# Patient Record
Sex: FEMALE | Race: BLACK OR AFRICAN AMERICAN | NOT HISPANIC OR LATINO
[De-identification: names, ages, dates, MRNs, and addresses within clinical notes are randomized per-mention and may not be internally consistent; named-entity substitution may affect disease eponyms.]

---

## 2018-03-06 PROBLEM — Z00.00 ENCOUNTER FOR PREVENTIVE HEALTH EXAMINATION: Status: ACTIVE | Noted: 2018-03-06

## 2018-03-19 ENCOUNTER — ASOB RESULT (OUTPATIENT)
Age: 33
End: 2018-03-19

## 2018-03-19 ENCOUNTER — APPOINTMENT (OUTPATIENT)
Dept: ANTEPARTUM | Facility: CLINIC | Age: 33
End: 2018-03-19
Payer: MEDICAID

## 2018-03-19 PROCEDURE — 76819 FETAL BIOPHYS PROFIL W/O NST: CPT

## 2018-03-19 PROCEDURE — 76805 OB US >/= 14 WKS SNGL FETUS: CPT

## 2018-03-20 ENCOUNTER — TRANSCRIPTION ENCOUNTER (OUTPATIENT)
Age: 33
End: 2018-03-20

## 2018-06-02 ENCOUNTER — TRANSCRIPTION ENCOUNTER (OUTPATIENT)
Age: 33
End: 2018-06-02

## 2018-06-03 ENCOUNTER — EMERGENCY (EMERGENCY)
Facility: HOSPITAL | Age: 33
LOS: 1 days | Discharge: STILL ADMITTED OR ADMINISTRATIVE | End: 2018-06-03
Admitting: EMERGENCY MEDICINE

## 2018-06-03 ENCOUNTER — INPATIENT (INPATIENT)
Facility: HOSPITAL | Age: 33
LOS: 2 days | Discharge: HOME | End: 2018-06-06
Attending: OBSTETRICS & GYNECOLOGY | Admitting: OBSTETRICS & GYNECOLOGY
Payer: MEDICAID

## 2018-06-03 ENCOUNTER — RESULT REVIEW (OUTPATIENT)
Age: 33
End: 2018-06-03

## 2018-06-03 VITALS
OXYGEN SATURATION: 100 % | DIASTOLIC BLOOD PRESSURE: 100 MMHG | TEMPERATURE: 99 F | SYSTOLIC BLOOD PRESSURE: 145 MMHG | RESPIRATION RATE: 18 BRPM | HEART RATE: 120 BPM

## 2018-06-03 VITALS — HEIGHT: 61 IN | WEIGHT: 160.94 LBS

## 2018-06-03 DIAGNOSIS — Z3A.00 WEEKS OF GESTATION OF PREGNANCY NOT SPECIFIED: ICD-10-CM

## 2018-06-03 DIAGNOSIS — O26.899 OTHER SPECIFIED PREGNANCY RELATED CONDITIONS, UNSPECIFIED TRIMESTER: ICD-10-CM

## 2018-06-03 LAB
BASOPHILS # BLD AUTO: 0.02 K/UL — SIGNIFICANT CHANGE UP (ref 0–0.2)
BASOPHILS NFR BLD AUTO: 0.2 % — SIGNIFICANT CHANGE UP (ref 0–2)
BLD GP AB SCN SERPL QL: NEGATIVE — SIGNIFICANT CHANGE UP
EOSINOPHIL # BLD AUTO: 0.03 K/UL — SIGNIFICANT CHANGE UP (ref 0–0.5)
EOSINOPHIL NFR BLD AUTO: 0.3 % — SIGNIFICANT CHANGE UP (ref 0–6)
HCT VFR BLD CALC: 42.2 % — SIGNIFICANT CHANGE UP (ref 34.5–45)
HGB BLD-MCNC: 14.1 G/DL — SIGNIFICANT CHANGE UP (ref 11.5–15.5)
IMM GRANULOCYTES # BLD AUTO: 0.13 # — SIGNIFICANT CHANGE UP
IMM GRANULOCYTES NFR BLD AUTO: 1.4 % — SIGNIFICANT CHANGE UP (ref 0–1.5)
LYMPHOCYTES # BLD AUTO: 1.45 K/UL — SIGNIFICANT CHANGE UP (ref 1–3.3)
LYMPHOCYTES # BLD AUTO: 15.7 % — SIGNIFICANT CHANGE UP (ref 13–44)
MCHC RBC-ENTMCNC: 30.1 PG — SIGNIFICANT CHANGE UP (ref 27–34)
MCHC RBC-ENTMCNC: 33.4 % — SIGNIFICANT CHANGE UP (ref 32–36)
MCV RBC AUTO: 90 FL — SIGNIFICANT CHANGE UP (ref 80–100)
MONOCYTES # BLD AUTO: 0.75 K/UL — SIGNIFICANT CHANGE UP (ref 0–0.9)
MONOCYTES NFR BLD AUTO: 8.1 % — SIGNIFICANT CHANGE UP (ref 2–14)
NEUTROPHILS # BLD AUTO: 6.88 K/UL — SIGNIFICANT CHANGE UP (ref 1.8–7.4)
NEUTROPHILS NFR BLD AUTO: 74.3 % — SIGNIFICANT CHANGE UP (ref 43–77)
NRBC # FLD: 0 — SIGNIFICANT CHANGE UP
PLATELET # BLD AUTO: 152 K/UL — SIGNIFICANT CHANGE UP (ref 150–400)
PMV BLD: 11.4 FL — SIGNIFICANT CHANGE UP (ref 7–13)
RBC # BLD: 4.69 M/UL — SIGNIFICANT CHANGE UP (ref 3.8–5.2)
RBC # FLD: 14 % — SIGNIFICANT CHANGE UP (ref 10.3–14.5)
RH IG SCN BLD-IMP: POSITIVE — SIGNIFICANT CHANGE UP
RH IG SCN BLD-IMP: POSITIVE — SIGNIFICANT CHANGE UP
WBC # BLD: 9.26 K/UL — SIGNIFICANT CHANGE UP (ref 3.8–10.5)
WBC # FLD AUTO: 9.26 K/UL — SIGNIFICANT CHANGE UP (ref 3.8–10.5)

## 2018-06-03 PROCEDURE — 88307 TISSUE EXAM BY PATHOLOGIST: CPT | Mod: 26

## 2018-06-03 RX ORDER — FERROUS SULFATE 325(65) MG
325 TABLET ORAL DAILY
Qty: 0 | Refills: 0 | Status: DISCONTINUED | OUTPATIENT
Start: 2018-06-04 | End: 2018-06-06

## 2018-06-03 RX ORDER — ACETAMINOPHEN 500 MG
975 TABLET ORAL EVERY 6 HOURS
Qty: 0 | Refills: 0 | Status: DISCONTINUED | OUTPATIENT
Start: 2018-06-04 | End: 2018-06-06

## 2018-06-03 RX ORDER — OXYTOCIN 10 UNIT/ML
41.67 VIAL (ML) INJECTION
Qty: 20 | Refills: 0 | Status: DISCONTINUED | OUTPATIENT
Start: 2018-06-03 | End: 2018-06-04

## 2018-06-03 RX ORDER — DOCUSATE SODIUM 100 MG
100 CAPSULE ORAL
Qty: 0 | Refills: 0 | Status: DISCONTINUED | OUTPATIENT
Start: 2018-06-04 | End: 2018-06-06

## 2018-06-03 RX ORDER — OXYTOCIN 10 UNIT/ML
333.33 VIAL (ML) INJECTION
Qty: 20 | Refills: 0 | Status: DISCONTINUED | OUTPATIENT
Start: 2018-06-03 | End: 2018-06-03

## 2018-06-03 RX ORDER — GENTAMICIN SULFATE 40 MG/ML
100 VIAL (ML) INJECTION EVERY 8 HOURS
Qty: 0 | Refills: 0 | Status: DISCONTINUED | OUTPATIENT
Start: 2018-06-04 | End: 2018-06-05

## 2018-06-03 RX ORDER — IBUPROFEN 200 MG
600 TABLET ORAL EVERY 6 HOURS
Qty: 0 | Refills: 0 | Status: COMPLETED | OUTPATIENT
Start: 2018-06-04 | End: 2019-05-03

## 2018-06-03 RX ORDER — KETOROLAC TROMETHAMINE 30 MG/ML
30 SYRINGE (ML) INJECTION EVERY 6 HOURS
Qty: 0 | Refills: 0 | Status: DISCONTINUED | OUTPATIENT
Start: 2018-06-03 | End: 2018-06-05

## 2018-06-03 RX ORDER — GENTAMICIN SULFATE 40 MG/ML
VIAL (ML) INJECTION
Qty: 0 | Refills: 0 | Status: DISCONTINUED | OUTPATIENT
Start: 2018-06-04 | End: 2018-06-05

## 2018-06-03 RX ORDER — GLYCERIN ADULT
1 SUPPOSITORY, RECTAL RECTAL AT BEDTIME
Qty: 0 | Refills: 0 | Status: DISCONTINUED | OUTPATIENT
Start: 2018-06-04 | End: 2018-06-06

## 2018-06-03 RX ORDER — OXYTOCIN 10 UNIT/ML
0.67 VIAL (ML) INJECTION
Qty: 20 | Refills: 0 | Status: DISCONTINUED | OUTPATIENT
Start: 2018-06-03 | End: 2018-06-03

## 2018-06-03 RX ORDER — LANOLIN
1 OINTMENT (GRAM) TOPICAL
Qty: 0 | Refills: 0 | Status: DISCONTINUED | OUTPATIENT
Start: 2018-06-04 | End: 2018-06-06

## 2018-06-03 RX ORDER — SIMETHICONE 80 MG/1
80 TABLET, CHEWABLE ORAL EVERY 4 HOURS
Qty: 0 | Refills: 0 | Status: DISCONTINUED | OUTPATIENT
Start: 2018-06-04 | End: 2018-06-06

## 2018-06-03 RX ORDER — OXYTOCIN 10 UNIT/ML
333.33 VIAL (ML) INJECTION
Qty: 20 | Refills: 0 | Status: DISCONTINUED | OUTPATIENT
Start: 2018-06-03 | End: 2018-06-04

## 2018-06-03 RX ORDER — OXYTOCIN 10 UNIT/ML
41.67 VIAL (ML) INJECTION
Qty: 20 | Refills: 0 | Status: DISCONTINUED | OUTPATIENT
Start: 2018-06-03 | End: 2018-06-03

## 2018-06-03 RX ORDER — SODIUM CHLORIDE 9 MG/ML
1000 INJECTION, SOLUTION INTRAVENOUS
Qty: 0 | Refills: 0 | Status: DISCONTINUED | OUTPATIENT
Start: 2018-06-03 | End: 2018-06-03

## 2018-06-03 RX ORDER — HEPARIN SODIUM 5000 [USP'U]/ML
5000 INJECTION INTRAVENOUS; SUBCUTANEOUS EVERY 12 HOURS
Qty: 0 | Refills: 0 | Status: DISCONTINUED | OUTPATIENT
Start: 2018-06-04 | End: 2018-06-06

## 2018-06-03 RX ORDER — TETANUS TOXOID, REDUCED DIPHTHERIA TOXOID AND ACELLULAR PERTUSSIS VACCINE, ADSORBED 5; 2.5; 8; 8; 2.5 [IU]/.5ML; [IU]/.5ML; UG/.5ML; UG/.5ML; UG/.5ML
0.5 SUSPENSION INTRAMUSCULAR ONCE
Qty: 0 | Refills: 0 | Status: DISCONTINUED | OUTPATIENT
Start: 2018-06-04 | End: 2018-06-06

## 2018-06-03 RX ORDER — SODIUM CHLORIDE 9 MG/ML
1000 INJECTION, SOLUTION INTRAVENOUS
Qty: 0 | Refills: 0 | Status: DISCONTINUED | OUTPATIENT
Start: 2018-06-03 | End: 2018-06-04

## 2018-06-03 RX ORDER — OXYCODONE HYDROCHLORIDE 5 MG/1
5 TABLET ORAL EVERY 4 HOURS
Qty: 0 | Refills: 0 | Status: COMPLETED | OUTPATIENT
Start: 2018-06-04 | End: 2018-06-11

## 2018-06-03 RX ORDER — SODIUM CHLORIDE 9 MG/ML
1000 INJECTION, SOLUTION INTRAVENOUS
Qty: 0 | Refills: 0 | Status: DISCONTINUED | OUTPATIENT
Start: 2018-06-03 | End: 2018-06-05

## 2018-06-03 RX ORDER — SODIUM CHLORIDE 9 MG/ML
1000 INJECTION, SOLUTION INTRAVENOUS
Qty: 0 | Refills: 0 | Status: DISCONTINUED | OUTPATIENT
Start: 2018-06-04 | End: 2018-06-05

## 2018-06-03 RX ORDER — GENTAMICIN SULFATE 40 MG/ML
140 VIAL (ML) INJECTION ONCE
Qty: 0 | Refills: 0 | Status: COMPLETED | OUTPATIENT
Start: 2018-06-03 | End: 2018-06-04

## 2018-06-03 RX ORDER — OXYCODONE HYDROCHLORIDE 5 MG/1
5 TABLET ORAL
Qty: 0 | Refills: 0 | Status: COMPLETED | OUTPATIENT
Start: 2018-06-04 | End: 2018-06-11

## 2018-06-03 RX ORDER — SODIUM CHLORIDE 9 MG/ML
1000 INJECTION, SOLUTION INTRAVENOUS ONCE
Qty: 0 | Refills: 0 | Status: COMPLETED | OUTPATIENT
Start: 2018-06-03 | End: 2018-06-03

## 2018-06-03 RX ORDER — DIPHENHYDRAMINE HCL 50 MG
25 CAPSULE ORAL EVERY 6 HOURS
Qty: 0 | Refills: 0 | Status: DISCONTINUED | OUTPATIENT
Start: 2018-06-04 | End: 2018-06-06

## 2018-06-03 RX ORDER — OXYTOCIN 10 UNIT/ML
2 VIAL (ML) INJECTION
Qty: 30 | Refills: 0 | Status: DISCONTINUED | OUTPATIENT
Start: 2018-06-03 | End: 2018-06-03

## 2018-06-03 RX ADMIN — Medication 125 MILLIUNIT(S)/MIN: at 22:00

## 2018-06-03 RX ADMIN — SODIUM CHLORIDE 75 MILLILITER(S): 9 INJECTION, SOLUTION INTRAVENOUS at 22:00

## 2018-06-03 RX ADMIN — SODIUM CHLORIDE 2000 MILLILITER(S): 9 INJECTION, SOLUTION INTRAVENOUS at 15:00

## 2018-06-03 RX ADMIN — Medication 0.25 MILLIGRAM(S): at 16:45

## 2018-06-03 RX ADMIN — Medication 30 MILLIGRAM(S): at 23:59

## 2018-06-03 RX ADMIN — Medication 100 MILLIGRAM(S): at 23:53

## 2018-06-03 RX ADMIN — Medication 2 MILLIUNIT(S)/MIN: at 19:22

## 2018-06-03 NOTE — PROVIDER CONTACT NOTE (OTHER) - BACKGROUND
410.3 s/p primary c/s for NRFHRT and arrest. Pt. having elevated blood pressures on right lower leg. Pt. febrile with 38.0 oral temp.

## 2018-06-03 NOTE — PROVIDER CONTACT NOTE (OTHER) - ACTION/TREATMENT ORDERED:
Reassess blood pressures and administer antibiotics. Reassess blood pressures on right arm and administer antibiotics for fever: gentamicin 100mg q8h; clindamycin 900mg q8h.

## 2018-06-03 NOTE — PROVIDER CONTACT NOTE (OTHER) - SITUATION
410.3 s/p primary c/s for NRFHRT and arrest. Pt. having elevated BPs when taken on right lower leg.

## 2018-06-03 NOTE — PROVIDER CONTACT NOTE (OTHER) - RECOMMENDATIONS
Assess blood pressures on arm and assess if elevated and to notify MD. Antibiotics ordered for patient.

## 2018-06-03 NOTE — PATIENT PROFILE OB - AS DELIV COMPLICATIONS OB
other/uterine arrest/abnormal fetal heart rate tracing/abnormal active phase labor abnormal active phase labor/abnormal fetal heart rate tracing/nuchal cord/other/uterine arrest

## 2018-06-03 NOTE — PROVIDER CONTACT NOTE (OTHER) - ASSESSMENT
Bleeding light to scant, fundus midline and firm. Pt. denies pain, denies feeling nauseous, light headed or blurred vision. 38.0 oral temperature.

## 2018-06-03 NOTE — ED ADULT TRIAGE NOTE - CHIEF COMPLAINT QUOTE
BIBEMS from home, 40weeks preg, c/o contractions Q7min. Membranes intact. Hypertensive in triage, called L&D pt to be sent up to L&D as per L&D triage.

## 2018-06-04 LAB
HCT VFR BLD CALC: 33.8 % — LOW (ref 34.5–45)
HGB BLD-MCNC: 11.7 G/DL — SIGNIFICANT CHANGE UP (ref 11.5–15.5)
MCHC RBC-ENTMCNC: 30.1 PG — SIGNIFICANT CHANGE UP (ref 27–34)
MCHC RBC-ENTMCNC: 34.6 % — SIGNIFICANT CHANGE UP (ref 32–36)
MCV RBC AUTO: 86.9 FL — SIGNIFICANT CHANGE UP (ref 80–100)
PLATELET # BLD AUTO: 146 K/UL — LOW (ref 150–400)
PMV BLD: 11.3 FL — SIGNIFICANT CHANGE UP (ref 7–13)
RBC # BLD: 3.89 M/UL — SIGNIFICANT CHANGE UP (ref 3.8–5.2)
RBC # FLD: 14.3 % — SIGNIFICANT CHANGE UP (ref 10.3–14.5)
T PALLIDUM AB TITR SER: NEGATIVE — SIGNIFICANT CHANGE UP
WBC # BLD: 20.58 K/UL — HIGH (ref 3.8–10.5)
WBC # FLD AUTO: 20.58 K/UL — HIGH (ref 3.8–10.5)

## 2018-06-04 RX ORDER — ONDANSETRON 8 MG/1
4 TABLET, FILM COATED ORAL ONCE
Qty: 0 | Refills: 0 | Status: COMPLETED | OUTPATIENT
Start: 2018-06-04 | End: 2018-06-04

## 2018-06-04 RX ORDER — IBUPROFEN 200 MG
600 TABLET ORAL EVERY 6 HOURS
Qty: 0 | Refills: 0 | Status: DISCONTINUED | OUTPATIENT
Start: 2018-06-04 | End: 2018-06-06

## 2018-06-04 RX ADMIN — Medication 200 MILLIGRAM(S): at 09:04

## 2018-06-04 RX ADMIN — Medication 600 MILLIGRAM(S): at 23:40

## 2018-06-04 RX ADMIN — Medication 975 MILLIGRAM(S): at 23:16

## 2018-06-04 RX ADMIN — Medication 30 MILLIGRAM(S): at 06:39

## 2018-06-04 RX ADMIN — Medication 200 MILLIGRAM(S): at 00:55

## 2018-06-04 RX ADMIN — Medication 975 MILLIGRAM(S): at 12:53

## 2018-06-04 RX ADMIN — Medication 30 MILLIGRAM(S): at 00:20

## 2018-06-04 RX ADMIN — ONDANSETRON 4 MILLIGRAM(S): 8 TABLET, FILM COATED ORAL at 06:39

## 2018-06-04 RX ADMIN — Medication 100 MILLIGRAM(S): at 09:02

## 2018-06-04 RX ADMIN — Medication 100 MILLIGRAM(S): at 15:13

## 2018-06-04 RX ADMIN — Medication 100 MILLIGRAM(S): at 21:02

## 2018-06-04 RX ADMIN — Medication 30 MILLIGRAM(S): at 13:30

## 2018-06-04 RX ADMIN — Medication 1 TABLET(S): at 12:53

## 2018-06-04 RX ADMIN — Medication 325 MILLIGRAM(S): at 12:53

## 2018-06-04 RX ADMIN — Medication 30 MILLIGRAM(S): at 18:04

## 2018-06-04 RX ADMIN — Medication 975 MILLIGRAM(S): at 23:40

## 2018-06-04 RX ADMIN — Medication 200 MILLIGRAM(S): at 22:52

## 2018-06-04 RX ADMIN — HEPARIN SODIUM 5000 UNIT(S): 5000 INJECTION INTRAVENOUS; SUBCUTANEOUS at 21:00

## 2018-06-04 RX ADMIN — Medication 100 MILLIGRAM(S): at 12:53

## 2018-06-04 RX ADMIN — Medication 975 MILLIGRAM(S): at 18:04

## 2018-06-04 RX ADMIN — Medication 975 MILLIGRAM(S): at 02:15

## 2018-06-04 RX ADMIN — Medication 975 MILLIGRAM(S): at 03:00

## 2018-06-04 RX ADMIN — Medication 30 MILLIGRAM(S): at 12:53

## 2018-06-04 RX ADMIN — Medication 600 MILLIGRAM(S): at 23:18

## 2018-06-04 RX ADMIN — Medication 200 MILLIGRAM(S): at 15:13

## 2018-06-04 RX ADMIN — HEPARIN SODIUM 5000 UNIT(S): 5000 INJECTION INTRAVENOUS; SUBCUTANEOUS at 09:04

## 2018-06-04 RX ADMIN — Medication 975 MILLIGRAM(S): at 13:30

## 2018-06-04 RX ADMIN — Medication 100 MILLIGRAM(S): at 18:05

## 2018-06-04 RX ADMIN — SIMETHICONE 80 MILLIGRAM(S): 80 TABLET, CHEWABLE ORAL at 18:04

## 2018-06-04 NOTE — PROGRESS NOTE ADULT - SUBJECTIVE AND OBJECTIVE BOX
Pain Service Follow-up  Postop Day  1    S/P  C- Section    T(C): 36.9 (06-04-18 @ 09:20), Max: 38.3 (06-04-18 @ 01:15)  HR: 81 (06-04-18 @ 09:20) (81 - 125)  BP: 125/73 (06-04-18 @ 09:20) (117/58 - 153/64)  RR: 16 (06-04-18 @ 09:20) (16 - 22)  SpO2: 100% (06-04-18 @ 09:20) (98% - 100%)  Wt(kg): --      THERAPY:     S/P Epidural Morphine    Sedation Score:	  [X] Alert	      [  ] Drowsy       [  ] Arousable	[  ] Asleep         [  ] Unresponsive    Side Effects:	  [X] None	      [  ] Nausea       [  ] Pruritus        [  ] Weakness   [  ] Numbness        ASSESSMENT/ PLAN   [ X ] Discontinue         [  ] Continue    [ X ] Documentation and Verification of current medications       Satisfactory Post Anesthetic Course

## 2018-06-04 NOTE — LACTATION INITIAL EVALUATION - LACTATION INTERVENTIONS
initiate skin to skin/initiate hand expression routine/assisted with deep latch and positioning  discussed  signs  of  effective  feeding and  swallowing.  instructed  to offer both  breast at a feeding ,feed on cue and safe  skin to skin.  reviewed  pcos  and  encouraged  frequent  attempts  and  may  start  hand  expression  and  add  pumping   between  feedings  .  instructed  to  observe  for   milk  supply  and  discuss  with  peds  .,   reviewed  log   and  encouraged  to  use  log

## 2018-06-04 NOTE — PROGRESS NOTE ADULT - PROBLEM SELECTOR PLAN 1
- Continue regular diet.  - Increase ambulation.  - Continue motrin, tylenol, oxycodone PRN for pain control.   - Discontinue medina catheter at 24 hours post-op   - F/u AM CBC

## 2018-06-04 NOTE — LACTATION INITIAL EVALUATION - INTERVENTION OUTCOME
nbn demonstrated  deep latch and  performed  with sucking and swallowing  noted .  positive  colostrum  on  hand  expression  .  reviewed  syringe  feeding  verbally  but  not  demonstrated./verbalizes understanding/demonstrates understanding of teaching

## 2018-06-04 NOTE — PROGRESS NOTE ADULT - SUBJECTIVE AND OBJECTIVE BOX
Postpartum Note,  Section  She is a  33y woman who is now post-operative day: 1    Subjective:  The patient feels well.  She is ambulating.   She is tolerating regular diet.  She denies nausea and vomiting.    Her pain is controlled.  She reports normal postpartum bleeding.    Physical exam:  Vital Signs Last 24 Hrs  T(C): 36.9 (2018 09:20), Max: 38.3 (2018 01:15)  T(F): 98.4 (2018 09:20), Max: 101 (2018 01:15)  HR: 81 (2018 09:20) (81 - 125)  BP: 125/73 (2018 09:20) (117/58 - 153/64)  BP(mean): 74 (2018 00:00) (66 - 86)  RR: 16 (2018 09:20) (16 - 22)  SpO2: 100% (2018 09:20) (98% - 100%)  Gen: NAD      LABS:                        11.7   20.58 )-----------( 146      ( 2018 07:00 )             33.8     ABO Interpretation: O ( @ 12:18)  Rh Interpretation: Positive ( @ 12:18)    Allergies    No Known Allergies    Intolerances      MEDICATIONS  (STANDING):  acetaminophen   Tablet. 975 milliGRAM(s) Oral every 6 hours  clindamycin IVPB      clindamycin IVPB 900 milliGRAM(s) IV Intermittent every 8 hours  diphtheria/tetanus/pertussis (acellular) Vaccine (ADAcel) 0.5 milliLiter(s) IntraMuscular once  ferrous    sulfate 325 milliGRAM(s) Oral daily  gentamicin   IVPB      gentamicin   IVPB 100 milliGRAM(s) IV Intermittent every 8 hours  heparin  Injectable 5000 Unit(s) SubCutaneous every 12 hours  ibuprofen  Tablet 600 milliGRAM(s) Oral every 6 hours  ketorolac   Injectable 30 milliGRAM(s) IV Push every 6 hours  lactated ringers. 1000 milliLiter(s) (125 mL/Hr) IV Continuous <Continuous>  lactated ringers. 1000 milliLiter(s) (75 mL/Hr) IV Continuous <Continuous>  oxyCODONE    IR 5 milliGRAM(s) Oral every 3 hours  oxytocin Infusion 333.333 milliUNIT(s)/Min (1000 mL/Hr) IV Continuous <Continuous>  oxytocin Infusion 41.667 milliUNIT(s)/Min (125 mL/Hr) IV Continuous <Continuous>  prenatal multivitamin 1 Tablet(s) Oral daily    MEDICATIONS  (PRN):  diphenhydrAMINE   Capsule 25 milliGRAM(s) Oral every 6 hours PRN Itching  docusate sodium 100 milliGRAM(s) Oral two times a day PRN Stool Softening  glycerin Suppository - Adult 1 Suppository(s) Rectal at bedtime PRN Constipation  lanolin Ointment 1 Application(s) Topical every 3 hours PRN Sore Nipples  oxyCODONE    IR 5 milliGRAM(s) Oral every 4 hours PRN Severe Pain (7 - 10)  simethicone 80 milliGRAM(s) Chew every 4 hours PRN Gas      Assessment and Plan  POD #1 s/p  section  Doing well.  Encourage ambulation.  continue antibiotics  f/u cbc in am

## 2018-06-04 NOTE — PROGRESS NOTE ADULT - SUBJECTIVE AND OBJECTIVE BOX
OB Progress Note: Primary  Delivery, POD#1    S: 34yo  POD#1 s/p pLTCS . Her pain is well controlled. She is tolerating a regular diet. Denies N/V. Denies CP/SOB/lightheadedness/dizziness.   She is ambulating without difficulty.  Indwelling catheter is in place.     O:   Vital Signs Last 24 Hrs  T(C): 37.6 (2018 05:45), Max: 38.3 (2018 01:15)  T(F): 99.6 (2018 05:45), Max: 101 (2018 01:15)  HR: 97 (2018 05:45) (97 - 125)  BP: 126/69 (2018 05:45) (117/58 - 153/64)  BP(mean): 74 (2018 00:00) (66 - 86)  RR: 18 (2018 05:45) (17 - 22)  SpO2: 100% (2018 05:45) (98% - 100%)    Labs:  Blood type: O Positive  Rubella IgG: RPR: Negative                          11.7   20.58<H> >-----------< 146<L>    (  @ 07:00 )             33.8<L>                        14.1   9.26 >-----------< 152    (  @ 10:55 )             42.2    PE:  General: NAD  Abdomen: Mildly distended, appropriately tender, incision c/d/i.  Extremities: No erythema, no pitting edema OB Progress Note: Primary  Delivery, POD#1    S: 32yo  POD#1 s/p pLTCS . Her pain is well controlled. She had vomiting overnight and nausea that has improved w/ zofran and is not tolerating juice.  Denies CP/SOB/lightheadedness/dizziness.  She is ambulating w/o difficulty.  Indwelling catheter is in place.     O:   Vital Signs Last 24 Hrs  T(C): 37.6 (2018 05:45), Max: 38.3 (2018 01:15)  T(F): 99.6 (2018 05:45), Max: 101 (2018 01:15)  HR: 97 (2018 05:45) (97 - 125)  BP: 126/69 (2018 05:45) (117/58 - 153/64)  BP(mean): 74 (2018 00:00) (66 - 86)  RR: 18 (2018 05:45) (17 - 22)  SpO2: 100% (2018 05:45) (98% - 100%)    Labs:  Blood type: O Positive  Rubella IgG: RPR: Negative                          11.7   20.58<H> >-----------< 146<L>    (  @ 07:00 )             33.8<L>                        14.1   9.26 >-----------< 152    (  @ 10:55 )             42.2    PE:  General: NAD  Abdomen: Mildly distended, appropriately tender, incision c/d/i.  Extremities: No erythema, no pitting edema

## 2018-06-05 ENCOUNTER — TRANSCRIPTION ENCOUNTER (OUTPATIENT)
Age: 33
End: 2018-06-05

## 2018-06-05 LAB
HCT VFR BLD CALC: 36.6 % — SIGNIFICANT CHANGE UP (ref 34.5–45)
HGB BLD-MCNC: 12.1 G/DL — SIGNIFICANT CHANGE UP (ref 11.5–15.5)
MCHC RBC-ENTMCNC: 29.7 PG — SIGNIFICANT CHANGE UP (ref 27–34)
MCHC RBC-ENTMCNC: 33.1 % — SIGNIFICANT CHANGE UP (ref 32–36)
MCV RBC AUTO: 89.9 FL — SIGNIFICANT CHANGE UP (ref 80–100)
NRBC # FLD: 0 — SIGNIFICANT CHANGE UP
PLATELET # BLD AUTO: 152 K/UL — SIGNIFICANT CHANGE UP (ref 150–400)
PMV BLD: 11.5 FL — SIGNIFICANT CHANGE UP (ref 7–13)
RBC # BLD: 4.07 M/UL — SIGNIFICANT CHANGE UP (ref 3.8–5.2)
RBC # FLD: 14.2 % — SIGNIFICANT CHANGE UP (ref 10.3–14.5)
WBC # BLD: 16.91 K/UL — HIGH (ref 3.8–10.5)
WBC # FLD AUTO: 16.91 K/UL — HIGH (ref 3.8–10.5)

## 2018-06-05 RX ORDER — OXYCODONE HYDROCHLORIDE 5 MG/1
5 TABLET ORAL EVERY 4 HOURS
Qty: 0 | Refills: 0 | Status: DISCONTINUED | OUTPATIENT
Start: 2018-06-05 | End: 2018-06-06

## 2018-06-05 RX ORDER — ACETAMINOPHEN 500 MG
3 TABLET ORAL
Qty: 0 | Refills: 0 | COMMUNITY
Start: 2018-06-05

## 2018-06-05 RX ORDER — IBUPROFEN 200 MG
1 TABLET ORAL
Qty: 0 | Refills: 0 | COMMUNITY
Start: 2018-06-05

## 2018-06-05 RX ORDER — OXYCODONE HYDROCHLORIDE 5 MG/1
5 TABLET ORAL
Qty: 0 | Refills: 0 | Status: DISCONTINUED | OUTPATIENT
Start: 2018-06-05 | End: 2018-06-06

## 2018-06-05 RX ADMIN — Medication 1 TABLET(S): at 12:30

## 2018-06-05 RX ADMIN — Medication 600 MILLIGRAM(S): at 06:15

## 2018-06-05 RX ADMIN — Medication 600 MILLIGRAM(S): at 13:30

## 2018-06-05 RX ADMIN — Medication 600 MILLIGRAM(S): at 05:49

## 2018-06-05 RX ADMIN — Medication 325 MILLIGRAM(S): at 12:30

## 2018-06-05 RX ADMIN — Medication 975 MILLIGRAM(S): at 17:50

## 2018-06-05 RX ADMIN — HEPARIN SODIUM 5000 UNIT(S): 5000 INJECTION INTRAVENOUS; SUBCUTANEOUS at 22:00

## 2018-06-05 RX ADMIN — SIMETHICONE 80 MILLIGRAM(S): 80 TABLET, CHEWABLE ORAL at 12:30

## 2018-06-05 RX ADMIN — SIMETHICONE 80 MILLIGRAM(S): 80 TABLET, CHEWABLE ORAL at 17:50

## 2018-06-05 RX ADMIN — Medication 975 MILLIGRAM(S): at 12:30

## 2018-06-05 RX ADMIN — Medication 600 MILLIGRAM(S): at 18:32

## 2018-06-05 RX ADMIN — Medication 975 MILLIGRAM(S): at 05:49

## 2018-06-05 RX ADMIN — Medication 975 MILLIGRAM(S): at 18:32

## 2018-06-05 RX ADMIN — Medication 975 MILLIGRAM(S): at 13:30

## 2018-06-05 RX ADMIN — Medication 600 MILLIGRAM(S): at 12:30

## 2018-06-05 RX ADMIN — Medication 100 MILLIGRAM(S): at 12:30

## 2018-06-05 RX ADMIN — Medication 600 MILLIGRAM(S): at 17:50

## 2018-06-05 RX ADMIN — HEPARIN SODIUM 5000 UNIT(S): 5000 INJECTION INTRAVENOUS; SUBCUTANEOUS at 09:07

## 2018-06-05 RX ADMIN — Medication 975 MILLIGRAM(S): at 06:15

## 2018-06-05 NOTE — PROGRESS NOTE ADULT - SUBJECTIVE AND OBJECTIVE BOX
OB Progress Note: pTLCS, POD#2    S: 34yo  POD#2 s/p pLTCS. Pain is well controlled. She is tolerating a regular diet and passing flatus. She is voiding spontaneously, and ambulating without difficulty. Denies CP/SOB. Denies lightheadedness/dizziness. Denies N/V.    O:  Vitals:  Vital Signs Last 24 Hrs  T(C): 37 (2018 06:54), Max: 37.1 (2018 19:15)  T(F): 98.6 (2018 06:54), Max: 98.7 (2018 19:15)  HR: 89 (2018 06:54) (81 - 94)  BP: 106/67 (2018 06:54) (106/53 - 125/73)  RR: 18 (2018 06:54) (16 - 18)  SpO2: 99% (2018 06:54) (96% - 100%)    MEDICATIONS  (STANDING):  acetaminophen   Tablet. 975 milliGRAM(s) Oral every 6 hours  clindamycin IVPB      clindamycin IVPB 900 milliGRAM(s) IV Intermittent every 8 hours  diphtheria/tetanus/pertussis (acellular) Vaccine (ADAcel) 0.5 milliLiter(s) IntraMuscular once  ferrous    sulfate 325 milliGRAM(s) Oral daily  gentamicin   IVPB      gentamicin   IVPB 100 milliGRAM(s) IV Intermittent every 8 hours  heparin  Injectable 5000 Unit(s) SubCutaneous every 12 hours  ibuprofen  Tablet 600 milliGRAM(s) Oral every 6 hours  oxyCODONE    IR 5 milliGRAM(s) Oral every 3 hours  prenatal multivitamin 1 Tablet(s) Oral daily      MEDICATIONS  (PRN):  diphenhydrAMINE   Capsule 25 milliGRAM(s) Oral every 6 hours PRN Itching  docusate sodium 100 milliGRAM(s) Oral two times a day PRN Stool Softening  glycerin Suppository - Adult 1 Suppository(s) Rectal at bedtime PRN Constipation  lanolin Ointment 1 Application(s) Topical every 3 hours PRN Sore Nipples  oxyCODONE    IR 5 milliGRAM(s) Oral every 4 hours PRN Severe Pain (7 - 10)  simethicone 80 milliGRAM(s) Chew every 4 hours PRN Gas      Labs:  Blood type: O Positive  Rubella IgG: RPR: Negative                          12.1   16.91<H> >-----------< 152    (  @ 06:50 )             36.6                        11.7   20.58<H> >-----------< 146<L>    (  @ 07:00 )             33.8<L>                        14.1   9.26 >-----------< 152    (  @ 10:55 )             42.2    PE:  General: NAD  Abdomen: Soft, appropriately tender, incision c/d/i.  Extremities: No erythema, no pitting edema OB Progress Note: pTLCS, POD#2    S: 34yo  POD#2 s/p pLTCS. Pain is well controlled. She had a mild HA this am that resolved w/ tylenol.  She is tolerating a regular diet and passing flatus. She is voiding spontaneously, and ambulating without difficulty. Denies CP/SOB. Denies lightheadedness/dizziness. Denies N/V.    O:  Vitals:  Vital Signs Last 24 Hrs  T(C): 37 (2018 06:54), Max: 37.1 (2018 19:15)  T(F): 98.6 (2018 06:54), Max: 98.7 (2018 19:15)  HR: 89 (2018 06:54) (81 - 94)  BP: 106/67 (2018 06:54) (106/53 - 125/73)  RR: 18 (2018 06:54) (16 - 18)  SpO2: 99% (2018 06:54) (96% - 100%)    MEDICATIONS  (STANDING):  acetaminophen   Tablet. 975 milliGRAM(s) Oral every 6 hours  clindamycin IVPB      clindamycin IVPB 900 milliGRAM(s) IV Intermittent every 8 hours  diphtheria/tetanus/pertussis (acellular) Vaccine (ADAcel) 0.5 milliLiter(s) IntraMuscular once  ferrous    sulfate 325 milliGRAM(s) Oral daily  gentamicin   IVPB      gentamicin   IVPB 100 milliGRAM(s) IV Intermittent every 8 hours  heparin  Injectable 5000 Unit(s) SubCutaneous every 12 hours  ibuprofen  Tablet 600 milliGRAM(s) Oral every 6 hours  oxyCODONE    IR 5 milliGRAM(s) Oral every 3 hours  prenatal multivitamin 1 Tablet(s) Oral daily      MEDICATIONS  (PRN):  diphenhydrAMINE   Capsule 25 milliGRAM(s) Oral every 6 hours PRN Itching  docusate sodium 100 milliGRAM(s) Oral two times a day PRN Stool Softening  glycerin Suppository - Adult 1 Suppository(s) Rectal at bedtime PRN Constipation  lanolin Ointment 1 Application(s) Topical every 3 hours PRN Sore Nipples  oxyCODONE    IR 5 milliGRAM(s) Oral every 4 hours PRN Severe Pain (7 - 10)  simethicone 80 milliGRAM(s) Chew every 4 hours PRN Gas      Labs:  Blood type: O Positive  Rubella IgG: RPR: Negative                          12.1   16.91<H> >-----------< 152    (  @ 06:50 )             36.6                        11.7   20.58<H> >-----------< 146<L>    (  @ 07:00 )             33.8<L>                        14.1   9.26 >-----------< 152    (  @ 10:55 )             42.2    PE:  General: NAD  Abdomen: Soft, appropriately tender, incision c/d/i.  Extremities: No erythema, no pitting edema

## 2018-06-05 NOTE — PROGRESS NOTE ADULT - PROBLEM SELECTOR PLAN 2
- pt afebrile since last fever of 38.3 on 5/4 at 01:15am   - s/p G/C   - c/w routine pp care    L Magdalena ISABEL PGY

## 2018-06-05 NOTE — DISCHARGE NOTE OB - MATERIALS PROVIDED
Discharge Medication Information for Patients and Families Pocket Guide/Breastfeeding Log/Back To Sleep Handout/  Immunization Record/Breastfeeding Mother’s Support Group Information/Guide to Postpartum Care/[facility] Hearing Screen Program/Breastfeeding Guide and Packet/Vaccinations/[facility] [place] Screening Program/Shaken Baby Prevention Handout

## 2018-06-05 NOTE — DISCHARGE NOTE OB - PATIENT PORTAL LINK FT
You can access the [facility] Patient Portal, offered by [facility], by registering with the following website: [email or web address]

## 2018-06-05 NOTE — PROGRESS NOTE ADULT - SUBJECTIVE AND OBJECTIVE BOX
Postpartum Note,  Section  She is a  33y woman who is now post-operative day: 2    Subjective:  The patient feels well.  She is ambulating.   She is tolerating regular diet.  She denies nausea and vomiting.  She is voiding.  Her pain is controlled.  She reports normal postpartum bleeding.    Physical exam:  Vital Signs Last 24 Hrs  T(C): 37 (2018 06:54), Max: 37.1 (2018 19:15)  T(F): 98.6 (2018 06:54), Max: 98.7 (2018 19:15)  HR: 89 (2018 06:54) (81 - 94)  BP: 106/67 (2018 06:54) (106/53 - 125/73)  BP(mean): --  RR: 18 (2018 06:54) (16 - 18)  SpO2: 99% (2018 06:54) (96% - 100%)  Gen: NAD      LABS:                        12.1   16.91 )-----------( 152      ( 2018 06:50 )             36.6       Allergies    No Known Allergies    Intolerances      MEDICATIONS  (STANDING):  acetaminophen   Tablet. 975 milliGRAM(s) Oral every 6 hours  diphtheria/tetanus/pertussis (acellular) Vaccine (ADAcel) 0.5 milliLiter(s) IntraMuscular once  ferrous    sulfate 325 milliGRAM(s) Oral daily  heparin  Injectable 5000 Unit(s) SubCutaneous every 12 hours  ibuprofen  Tablet 600 milliGRAM(s) Oral every 6 hours  oxyCODONE    IR 5 milliGRAM(s) Oral every 3 hours  prenatal multivitamin 1 Tablet(s) Oral daily    MEDICATIONS  (PRN):  diphenhydrAMINE   Capsule 25 milliGRAM(s) Oral every 6 hours PRN Itching  docusate sodium 100 milliGRAM(s) Oral two times a day PRN Stool Softening  glycerin Suppository - Adult 1 Suppository(s) Rectal at bedtime PRN Constipation  lanolin Ointment 1 Application(s) Topical every 3 hours PRN Sore Nipples  oxyCODONE    IR 5 milliGRAM(s) Oral every 4 hours PRN Severe Pain (7 - 10)  simethicone 80 milliGRAM(s) Chew every 4 hours PRN Gas      Assessment and Plan  POD #2 s/p  section  Doing well.  Encourage ambulation.

## 2018-06-05 NOTE — DISCHARGE NOTE OB - CARE PLAN
Principal Discharge DX:	 delivery delivered  Goal:	RECOVERY  Assessment and plan of treatment:	SEE BELOW

## 2018-06-05 NOTE — DISCHARGE NOTE OB - CARE PROVIDER_API CALL
Shaylee Zhang), Obstetrics and Gynecology  [address] [place] [address]  Phone: [phone]  Fax: [phone]

## 2018-06-06 VITALS
RESPIRATION RATE: 18 BRPM | OXYGEN SATURATION: 100 % | TEMPERATURE: 98 F | HEART RATE: 84 BPM | SYSTOLIC BLOOD PRESSURE: 124 MMHG | DIASTOLIC BLOOD PRESSURE: 72 MMHG

## 2018-06-06 RX ADMIN — Medication 600 MILLIGRAM(S): at 00:10

## 2018-06-06 RX ADMIN — Medication 600 MILLIGRAM(S): at 00:50

## 2018-06-06 RX ADMIN — Medication 975 MILLIGRAM(S): at 07:20

## 2018-06-06 RX ADMIN — Medication 975 MILLIGRAM(S): at 00:50

## 2018-06-06 RX ADMIN — Medication 600 MILLIGRAM(S): at 06:40

## 2018-06-06 RX ADMIN — Medication 975 MILLIGRAM(S): at 06:40

## 2018-06-06 RX ADMIN — Medication 975 MILLIGRAM(S): at 00:10

## 2018-06-06 RX ADMIN — Medication 600 MILLIGRAM(S): at 07:20

## 2018-06-06 RX ADMIN — HEPARIN SODIUM 5000 UNIT(S): 5000 INJECTION INTRAVENOUS; SUBCUTANEOUS at 08:33

## 2018-06-06 NOTE — PROGRESS NOTE ADULT - SUBJECTIVE AND OBJECTIVE BOX
Patient assessed at 0754.  Subjective  Pain: Patient denies any pain at the time of assessment. Pain being managed well by pain management protocol.  Complaints: None  Milestones:  Alert and orientedx3. Out of bed ambulating. Positive flatus. Voiding.  Tolerating a regular diet.  Infant feeding: breastfeeding and formula feeding  Feeding related issues and/or concerns: none    Objective  Vital Signs:  Vital Signs Last 24 Hrs  T(C): 36.6 (2018 06:51), Max: 36.9 (2018 18:06)  T(F): 97.9 (2018 06:51), Max: 98.4 (2018 18:06)  HR: 84 (2018 06:51) (80 - 89)  BP: 124/72 (2018 06:51) (105/51 - 124/72)  BP(mean): --  RR: 18 (2018 06:51) (18 - 18)  SpO2: 100% (2018 06:51) (100% - 100%)    Labs:                        12.1   16.91 )-----------( 152      ( 2018 06:50 )             36.6     Blood Type: Opositive  Rubella: Immune  RPR: nonreactive    Medications  MEDICATIONS  (STANDING):  acetaminophen   Tablet. 975 milliGRAM(s) Oral every 6 hours  diphtheria/tetanus/pertussis (acellular) Vaccine (ADAcel) 0.5 milliLiter(s) IntraMuscular once  ferrous    sulfate 325 milliGRAM(s) Oral daily  heparin  Injectable 5000 Unit(s) SubCutaneous every 12 hours  ibuprofen  Tablet 600 milliGRAM(s) Oral every 6 hours  oxyCODONE    IR 5 milliGRAM(s) Oral every 3 hours  prenatal multivitamin 1 Tablet(s) Oral daily    MEDICATIONS  (PRN):  diphenhydrAMINE   Capsule 25 milliGRAM(s) Oral every 6 hours PRN Itching  docusate sodium 100 milliGRAM(s) Oral two times a day PRN Stool Softening  glycerin Suppository - Adult 1 Suppository(s) Rectal at bedtime PRN Constipation  lanolin Ointment 1 Application(s) Topical every 3 hours PRN Sore Nipples  oxyCODONE    IR 5 milliGRAM(s) Oral every 4 hours PRN Severe Pain (7 - 10)  simethicone 80 milliGRAM(s) Chew every 4 hours PRN Gas    Assessment  32y/o     Day#3    primary post-operative  section delivery for arrest of dilatation, arrest of descent                                        Condition: Stable  Past Medical History significant for: polycystic ovarian syndrome on metformin before pregnancy  Current Issues: EBL 600cc intrapartum temperature 38 on 18 at 2230 and postpartum temperature 38.3 on 6/3/18 at 0115 s/p gentamicin and clindamycin. Patient afebrile >24hours.   Lung sounds clear bilaterally.  Breasts: soft, nontender  Nipples: intact  Abdomen: Soft, nondistended and nontender. Bowel sounds present. Fundus firm  Abdominal incision: Intact with steri strips and scant serosanguinous drainage noted. No active blood noted. Abdominal pad applied.   Vaginal: Lochia light rubra  Extremities: Slight edema noted bilaterally to lower extremities, negative Anjana's Sign, nontender. Positive pedal pulses  Other relevant physical exam findings: none    Plan  Continue routine post-operative and postpartum care  Increase ambulation, analgesia PRN and pain medication protocol standing oxycodone, ibuprofen and acetaminophen.  Encouraged to wear abdominal binder for support and to use incentive spirometer  Abdominal incision to be assessed by RN before discharge due to scant serosanguinous drainage.   Discharge to home today. Discussed discharge planning.

## 2018-12-25 NOTE — PATIENT PROFILE OB - VISION (WITH CORRECTIVE LENSES IF THE PATIENT USUALLY WEARS THEM):
information could not be obtained
Normal vision: sees adequately in most situations; can see medication labels, newsprint

## 2019-08-19 ENCOUNTER — APPOINTMENT (OUTPATIENT)
Dept: ANTEPARTUM | Facility: CLINIC | Age: 34
End: 2019-08-19
Payer: MEDICAID

## 2019-08-19 ENCOUNTER — ASOB RESULT (OUTPATIENT)
Age: 34
End: 2019-08-19

## 2019-08-19 PROCEDURE — 76815 OB US LIMITED FETUS(S): CPT

## 2019-08-23 ENCOUNTER — APPOINTMENT (OUTPATIENT)
Dept: MATERNAL FETAL MEDICINE | Facility: CLINIC | Age: 34
End: 2019-08-23
Payer: MEDICAID

## 2019-08-23 ENCOUNTER — ASOB RESULT (OUTPATIENT)
Age: 34
End: 2019-08-23

## 2019-08-23 PROCEDURE — G0108 DIAB MANAGE TRN  PER INDIV: CPT

## 2019-09-13 ENCOUNTER — APPOINTMENT (OUTPATIENT)
Dept: MATERNAL FETAL MEDICINE | Facility: CLINIC | Age: 34
End: 2019-09-13
Payer: MEDICAID

## 2019-09-13 ENCOUNTER — APPOINTMENT (OUTPATIENT)
Dept: ANTEPARTUM | Facility: CLINIC | Age: 34
End: 2019-09-13
Payer: MEDICAID

## 2019-09-13 ENCOUNTER — ASOB RESULT (OUTPATIENT)
Age: 34
End: 2019-09-13

## 2019-09-13 PROCEDURE — 76811 OB US DETAILED SNGL FETUS: CPT

## 2019-09-13 PROCEDURE — 76819 FETAL BIOPHYS PROFIL W/O NST: CPT

## 2019-09-13 PROCEDURE — 99212 OFFICE O/P EST SF 10 MIN: CPT | Mod: 25

## 2019-09-13 PROCEDURE — G0108 DIAB MANAGE TRN  PER INDIV: CPT

## 2019-09-19 ENCOUNTER — OUTPATIENT (OUTPATIENT)
Dept: OUTPATIENT SERVICES | Facility: HOSPITAL | Age: 34
LOS: 1 days | End: 2019-09-19

## 2019-09-19 VITALS
TEMPERATURE: 98 F | WEIGHT: 166.01 LBS | RESPIRATION RATE: 16 BRPM | HEART RATE: 91 BPM | HEIGHT: 62.5 IN | SYSTOLIC BLOOD PRESSURE: 108 MMHG | OXYGEN SATURATION: 98 % | DIASTOLIC BLOOD PRESSURE: 80 MMHG

## 2019-09-19 DIAGNOSIS — Z3A.00 WEEKS OF GESTATION OF PREGNANCY NOT SPECIFIED: ICD-10-CM

## 2019-09-19 DIAGNOSIS — Z98.891 HISTORY OF UTERINE SCAR FROM PREVIOUS SURGERY: ICD-10-CM

## 2019-09-19 DIAGNOSIS — Z34.90 ENCOUNTER FOR SUPERVISION OF NORMAL PREGNANCY, UNSPECIFIED, UNSPECIFIED TRIMESTER: ICD-10-CM

## 2019-09-19 DIAGNOSIS — Z98.891 HISTORY OF UTERINE SCAR FROM PREVIOUS SURGERY: Chronic | ICD-10-CM

## 2019-09-19 LAB
ANION GAP SERPL CALC-SCNC: 13 MMO/L — SIGNIFICANT CHANGE UP (ref 7–14)
APPEARANCE UR: SIGNIFICANT CHANGE UP
BACTERIA # UR AUTO: SIGNIFICANT CHANGE UP
BILIRUB UR-MCNC: NEGATIVE — SIGNIFICANT CHANGE UP
BLD GP AB SCN SERPL QL: NEGATIVE — SIGNIFICANT CHANGE UP
BLOOD UR QL VISUAL: SIGNIFICANT CHANGE UP
BUN SERPL-MCNC: 10 MG/DL — SIGNIFICANT CHANGE UP (ref 7–23)
CALCIUM SERPL-MCNC: 10.2 MG/DL — SIGNIFICANT CHANGE UP (ref 8.4–10.5)
CHLORIDE SERPL-SCNC: 101 MMOL/L — SIGNIFICANT CHANGE UP (ref 98–107)
CO2 SERPL-SCNC: 25 MMOL/L — SIGNIFICANT CHANGE UP (ref 22–31)
COLOR SPEC: SIGNIFICANT CHANGE UP
CREAT SERPL-MCNC: 0.62 MG/DL — SIGNIFICANT CHANGE UP (ref 0.5–1.3)
GLUCOSE SERPL-MCNC: 79 MG/DL — SIGNIFICANT CHANGE UP (ref 70–99)
GLUCOSE UR-MCNC: NEGATIVE — SIGNIFICANT CHANGE UP
HCT VFR BLD CALC: 41.5 % — SIGNIFICANT CHANGE UP (ref 34.5–45)
HGB BLD-MCNC: 13.6 G/DL — SIGNIFICANT CHANGE UP (ref 11.5–15.5)
HYALINE CASTS # UR AUTO: NEGATIVE — SIGNIFICANT CHANGE UP
KETONES UR-MCNC: NEGATIVE — SIGNIFICANT CHANGE UP
LEUKOCYTE ESTERASE UR-ACNC: SIGNIFICANT CHANGE UP
MCHC RBC-ENTMCNC: 29.8 PG — SIGNIFICANT CHANGE UP (ref 27–34)
MCHC RBC-ENTMCNC: 32.8 % — SIGNIFICANT CHANGE UP (ref 32–36)
MCV RBC AUTO: 90.8 FL — SIGNIFICANT CHANGE UP (ref 80–100)
NITRITE UR-MCNC: NEGATIVE — SIGNIFICANT CHANGE UP
NRBC # FLD: 0 K/UL — SIGNIFICANT CHANGE UP (ref 0–0)
PH UR: 6 — SIGNIFICANT CHANGE UP (ref 5–8)
PLATELET # BLD AUTO: 157 K/UL — SIGNIFICANT CHANGE UP (ref 150–400)
PMV BLD: 11.8 FL — SIGNIFICANT CHANGE UP (ref 7–13)
POTASSIUM SERPL-MCNC: 4 MMOL/L — SIGNIFICANT CHANGE UP (ref 3.5–5.3)
POTASSIUM SERPL-SCNC: 4 MMOL/L — SIGNIFICANT CHANGE UP (ref 3.5–5.3)
PROT UR-MCNC: 10 — SIGNIFICANT CHANGE UP
RBC # BLD: 4.57 M/UL — SIGNIFICANT CHANGE UP (ref 3.8–5.2)
RBC # FLD: 13.4 % — SIGNIFICANT CHANGE UP (ref 10.3–14.5)
RBC CASTS # UR COMP ASSIST: SIGNIFICANT CHANGE UP (ref 0–?)
RH IG SCN BLD-IMP: POSITIVE — SIGNIFICANT CHANGE UP
SODIUM SERPL-SCNC: 139 MMOL/L — SIGNIFICANT CHANGE UP (ref 135–145)
SP GR SPEC: 1.01 — SIGNIFICANT CHANGE UP (ref 1–1.04)
SQUAMOUS # UR AUTO: SIGNIFICANT CHANGE UP
UROBILINOGEN FLD QL: NORMAL — SIGNIFICANT CHANGE UP
WBC # BLD: 9.22 K/UL — SIGNIFICANT CHANGE UP (ref 3.8–10.5)
WBC # FLD AUTO: 9.22 K/UL — SIGNIFICANT CHANGE UP (ref 3.8–10.5)
WBC UR QL: HIGH (ref 0–?)

## 2019-09-19 NOTE — OB PST NOTE - PROBLEM SELECTOR PLAN 1
Pt. is scheduled for a repeat  10/3/19.  Pt. verbalized understanding of instructions as discussed and outlined on the instruction sheets.  Pt. did teach back on Chlorhexidine wash.

## 2019-09-19 NOTE — OB PST NOTE - NSANTHOSAYNRD_GEN_A_CORE
No. BUSTER screening performed.  STOP BANG Legend: 0-2 = LOW Risk; 3-4 = INTERMEDIATE Risk; 5-8 = HIGH Risk

## 2019-09-20 LAB — T PALLIDUM AB TITR SER: NEGATIVE — SIGNIFICANT CHANGE UP

## 2019-09-24 ENCOUNTER — APPOINTMENT (OUTPATIENT)
Dept: MATERNAL FETAL MEDICINE | Facility: CLINIC | Age: 34
End: 2019-09-24

## 2019-10-01 RX ORDER — SODIUM CHLORIDE 9 MG/ML
1000 INJECTION, SOLUTION INTRAVENOUS
Refills: 0 | Status: DISCONTINUED | OUTPATIENT
Start: 2019-10-03 | End: 2019-10-03

## 2019-10-02 ENCOUNTER — TRANSCRIPTION ENCOUNTER (OUTPATIENT)
Age: 34
End: 2019-10-02

## 2019-10-03 ENCOUNTER — INPATIENT (INPATIENT)
Facility: HOSPITAL | Age: 34
LOS: 2 days | Discharge: HOME | End: 2019-10-06
Attending: OBSTETRICS & GYNECOLOGY | Admitting: OBSTETRICS & GYNECOLOGY

## 2019-10-03 VITALS — SYSTOLIC BLOOD PRESSURE: 143 MMHG | HEART RATE: 117 BPM | DIASTOLIC BLOOD PRESSURE: 87 MMHG

## 2019-10-03 DIAGNOSIS — Z98.891 HISTORY OF UTERINE SCAR FROM PREVIOUS SURGERY: ICD-10-CM

## 2019-10-03 DIAGNOSIS — Z98.891 HISTORY OF UTERINE SCAR FROM PREVIOUS SURGERY: Chronic | ICD-10-CM

## 2019-10-03 DIAGNOSIS — Z34.90 ENCOUNTER FOR SUPERVISION OF NORMAL PREGNANCY, UNSPECIFIED, UNSPECIFIED TRIMESTER: ICD-10-CM

## 2019-10-03 LAB
BLD GP AB SCN SERPL QL: NEGATIVE — SIGNIFICANT CHANGE UP
RH IG SCN BLD-IMP: POSITIVE — SIGNIFICANT CHANGE UP

## 2019-10-03 RX ORDER — OXYCODONE HYDROCHLORIDE 5 MG/1
5 TABLET ORAL
Refills: 0 | Status: COMPLETED | OUTPATIENT
Start: 2019-10-03 | End: 2019-10-10

## 2019-10-03 RX ORDER — OXYCODONE HYDROCHLORIDE 5 MG/1
5 TABLET ORAL
Refills: 0 | Status: DISCONTINUED | OUTPATIENT
Start: 2019-10-03 | End: 2019-10-04

## 2019-10-03 RX ORDER — OXYCODONE HYDROCHLORIDE 5 MG/1
10 TABLET ORAL
Refills: 0 | Status: DISCONTINUED | OUTPATIENT
Start: 2019-10-03 | End: 2019-10-04

## 2019-10-03 RX ORDER — IBUPROFEN 200 MG
600 TABLET ORAL EVERY 6 HOURS
Refills: 0 | Status: COMPLETED | OUTPATIENT
Start: 2019-10-03 | End: 2020-08-31

## 2019-10-03 RX ORDER — ACETAMINOPHEN 500 MG
975 TABLET ORAL
Refills: 0 | Status: DISCONTINUED | OUTPATIENT
Start: 2019-10-03 | End: 2019-10-06

## 2019-10-03 RX ORDER — OXYCODONE HYDROCHLORIDE 5 MG/1
5 TABLET ORAL ONCE
Refills: 0 | Status: DISCONTINUED | OUTPATIENT
Start: 2019-10-03 | End: 2019-10-06

## 2019-10-03 RX ORDER — CITRIC ACID/SODIUM CITRATE 300-500 MG
30 SOLUTION, ORAL ORAL ONCE
Refills: 0 | Status: COMPLETED | OUTPATIENT
Start: 2019-10-03 | End: 2019-10-03

## 2019-10-03 RX ORDER — MORPHINE SULFATE 50 MG/1
0.2 CAPSULE, EXTENDED RELEASE ORAL ONCE
Refills: 0 | Status: DISCONTINUED | OUTPATIENT
Start: 2019-10-03 | End: 2019-10-04

## 2019-10-03 RX ORDER — HYDROMORPHONE HYDROCHLORIDE 2 MG/ML
1 INJECTION INTRAMUSCULAR; INTRAVENOUS; SUBCUTANEOUS
Refills: 0 | Status: DISCONTINUED | OUTPATIENT
Start: 2019-10-03 | End: 2019-10-04

## 2019-10-03 RX ORDER — DIPHENHYDRAMINE HCL 50 MG
25 CAPSULE ORAL EVERY 4 HOURS
Refills: 0 | Status: DISCONTINUED | OUTPATIENT
Start: 2019-10-03 | End: 2019-10-04

## 2019-10-03 RX ORDER — HEPARIN SODIUM 5000 [USP'U]/ML
5000 INJECTION INTRAVENOUS; SUBCUTANEOUS EVERY 12 HOURS
Refills: 0 | Status: DISCONTINUED | OUTPATIENT
Start: 2019-10-03 | End: 2019-10-06

## 2019-10-03 RX ORDER — NALOXONE HYDROCHLORIDE 4 MG/.1ML
0.1 SPRAY NASAL
Refills: 0 | Status: DISCONTINUED | OUTPATIENT
Start: 2019-10-03 | End: 2019-10-04

## 2019-10-03 RX ORDER — INFLUENZA VIRUS VACCINE 15; 15; 15; 15 UG/.5ML; UG/.5ML; UG/.5ML; UG/.5ML
0.5 SUSPENSION INTRAMUSCULAR ONCE
Refills: 0 | Status: DISCONTINUED | OUTPATIENT
Start: 2019-10-03 | End: 2019-10-06

## 2019-10-03 RX ORDER — GLYCERIN ADULT
1 SUPPOSITORY, RECTAL RECTAL AT BEDTIME
Refills: 0 | Status: DISCONTINUED | OUTPATIENT
Start: 2019-10-03 | End: 2019-10-06

## 2019-10-03 RX ORDER — DOCUSATE SODIUM 100 MG
100 CAPSULE ORAL
Refills: 0 | Status: DISCONTINUED | OUTPATIENT
Start: 2019-10-03 | End: 2019-10-06

## 2019-10-03 RX ORDER — METOCLOPRAMIDE HCL 10 MG
10 TABLET ORAL ONCE
Refills: 0 | Status: COMPLETED | OUTPATIENT
Start: 2019-10-03 | End: 2019-10-03

## 2019-10-03 RX ORDER — KETOROLAC TROMETHAMINE 30 MG/ML
30 SYRINGE (ML) INJECTION EVERY 6 HOURS
Refills: 0 | Status: DISCONTINUED | OUTPATIENT
Start: 2019-10-03 | End: 2019-10-04

## 2019-10-03 RX ORDER — FAMOTIDINE 10 MG/ML
20 INJECTION INTRAVENOUS ONCE
Refills: 0 | Status: COMPLETED | OUTPATIENT
Start: 2019-10-03 | End: 2019-10-03

## 2019-10-03 RX ORDER — TETANUS TOXOID, REDUCED DIPHTHERIA TOXOID AND ACELLULAR PERTUSSIS VACCINE, ADSORBED 5; 2.5; 8; 8; 2.5 [IU]/.5ML; [IU]/.5ML; UG/.5ML; UG/.5ML; UG/.5ML
0.5 SUSPENSION INTRAMUSCULAR ONCE
Refills: 0 | Status: COMPLETED | OUTPATIENT
Start: 2019-10-03

## 2019-10-03 RX ORDER — DIPHENHYDRAMINE HCL 50 MG
25 CAPSULE ORAL EVERY 6 HOURS
Refills: 0 | Status: DISCONTINUED | OUTPATIENT
Start: 2019-10-03 | End: 2019-10-06

## 2019-10-03 RX ORDER — SODIUM CHLORIDE 9 MG/ML
1000 INJECTION, SOLUTION INTRAVENOUS
Refills: 0 | Status: DISCONTINUED | OUTPATIENT
Start: 2019-10-03 | End: 2019-10-03

## 2019-10-03 RX ORDER — ONDANSETRON 8 MG/1
4 TABLET, FILM COATED ORAL EVERY 6 HOURS
Refills: 0 | Status: DISCONTINUED | OUTPATIENT
Start: 2019-10-03 | End: 2019-10-04

## 2019-10-03 RX ORDER — SIMETHICONE 80 MG/1
80 TABLET, CHEWABLE ORAL EVERY 4 HOURS
Refills: 0 | Status: DISCONTINUED | OUTPATIENT
Start: 2019-10-03 | End: 2019-10-06

## 2019-10-03 RX ORDER — OXYTOCIN 10 UNIT/ML
333.33 VIAL (ML) INJECTION
Qty: 20 | Refills: 0 | Status: DISCONTINUED | OUTPATIENT
Start: 2019-10-03 | End: 2019-10-03

## 2019-10-03 RX ORDER — MAGNESIUM HYDROXIDE 400 MG/1
30 TABLET, CHEWABLE ORAL
Refills: 0 | Status: DISCONTINUED | OUTPATIENT
Start: 2019-10-03 | End: 2019-10-06

## 2019-10-03 RX ORDER — SODIUM CHLORIDE 9 MG/ML
1000 INJECTION, SOLUTION INTRAVENOUS ONCE
Refills: 0 | Status: COMPLETED | OUTPATIENT
Start: 2019-10-03 | End: 2019-10-03

## 2019-10-03 RX ORDER — LANOLIN
1 OINTMENT (GRAM) TOPICAL EVERY 6 HOURS
Refills: 0 | Status: DISCONTINUED | OUTPATIENT
Start: 2019-10-03 | End: 2019-10-06

## 2019-10-03 RX ADMIN — SODIUM CHLORIDE 2000 MILLILITER(S): 9 INJECTION, SOLUTION INTRAVENOUS at 09:54

## 2019-10-03 RX ADMIN — Medication 1000 MILLIUNIT(S)/MIN: at 14:28

## 2019-10-03 RX ADMIN — SODIUM CHLORIDE 200 MILLILITER(S): 9 INJECTION, SOLUTION INTRAVENOUS at 09:54

## 2019-10-03 RX ADMIN — FAMOTIDINE 20 MILLIGRAM(S): 10 INJECTION INTRAVENOUS at 09:53

## 2019-10-03 RX ADMIN — ONDANSETRON 4 MILLIGRAM(S): 8 TABLET, FILM COATED ORAL at 18:35

## 2019-10-03 RX ADMIN — Medication 30 MILLIGRAM(S): at 18:35

## 2019-10-03 RX ADMIN — HEPARIN SODIUM 5000 UNIT(S): 5000 INJECTION INTRAVENOUS; SUBCUTANEOUS at 18:35

## 2019-10-03 RX ADMIN — Medication 30 MILLIGRAM(S): at 19:38

## 2019-10-03 RX ADMIN — Medication 10 MILLIGRAM(S): at 09:53

## 2019-10-03 RX ADMIN — Medication 30 MILLILITER(S): at 09:54

## 2019-10-03 NOTE — OB NEONATOLOGY/PEDIATRICIAN DELIVERY SUMMARY - NSPEDSNEONOTESA_OBGYN_ALL_OB_FT
Scheduled repeat c/s on 39.3wk GA male infant. Mother is a , blood type O+, pnl neg/imm, GBS neg. maternal hx of diet controlled GDM.   AROM at delivery, clear fluid with terminal mec. Delayed cord clamping [id number], brought to radiant warmer, dried and suctioned of minimal secretions.   mother wants to breast & bottle feed, wants HepB, wants circ

## 2019-10-03 NOTE — BRIEF OPERATIVE NOTE - OPERATION/FINDINGS
Repeat CD low transverse. Atraumatic delivery of a viable boy. Skin scar tissue excised. Thin lower uterine segment noted. APGARS 9/9. Delayed cord clamping. Hysterotomy repaired with vicril suture and figure of eight sutures placed for hemostasis and reinforcement. Tubes and ovaries noted to be normal bilaterally. Peritoneum, muscle reapproximated, fascia closed, sq and skin closed. EBL 600cc. No complications. Baby and mother in good condition transferred to PACU.

## 2019-10-03 NOTE — OB PROVIDER DELIVERY SUMMARY - NSPROVIDERDELIVERYNOTE_OBGYN_ALL_OB_FT
Repeat CD low transverse. Atraumatic delivery of a viable boy. Skin scar tissue excised. Thin lower uterine segment noted. APGARS 9/9. Delayed cord clamping. Hysterotomy repaired with vicril suture and figure of eight sutures placed for hemostasis and reinforcement. Tubes and ovaries noted to be normal bilaterally. Peritoneum, muscle reapproximated, fascia closed, sq and skin closed. EBL 600cc. No complications. Baby and mother in good condition transferred to PACU.   MD nilda

## 2019-10-03 NOTE — BRIEF OPERATIVE NOTE - NSICDXBRIEFPREOP_GEN_ALL_CORE_FT
PRE-OP DIAGNOSIS:  H/O  section 03-Oct-2019 15:52:53  Denise Colón  Gestational diabetes 03-Oct-2019 15:53:17  Denise Cloón

## 2019-10-03 NOTE — OB PROVIDER H&P - ATTENDING COMMENTS
Patient was seen and evaluated at bedside. She denies any c/o. Risk of planned repeat CD reviewed with patient again: pain, bleeding, infection, injury to adjacent organs and vessels, effect on subs. pregnancies. F/u and recovery discussed. Informed consent signed. Plan to OR for planned procedure.  MD jose

## 2019-10-03 NOTE — OB PROVIDER H&P - ASSESSMENT
Admit to L&D  rabia rLTCS  NPO  FS  routine labs  EFM/TOCO  anesthesia consult  Diane Tadeo PAC  10-03-19 @ 09:38

## 2019-10-03 NOTE — BRIEF OPERATIVE NOTE - NSICDXBRIEFPOSTOP_GEN_ALL_CORE_FT
POST-OP DIAGNOSIS:  Gestational diabetes 03-Oct-2019 15:53:21  Denise Colón  H/O  section 03-Oct-2019 15:52:57  Denise Colón

## 2019-10-03 NOTE — OB RN DELIVERY SUMMARY - NS_SEPSISRSKCALC_OBGYN_ALL_OB_FT
EOS calculated successfully. EOS Risk Factor: 0.5/1000 live births ([place] national incidence); GA=39w3d; Temp=99; ROM=0; GBS='Negative'; Antibiotics='No antibiotics or any antibiotics < 2 hrs prior to birth'

## 2019-10-03 NOTE — OB PROVIDER H&P - HISTORY OF PRESENT ILLNESS
33yo female  EDC 10/7/19  presents@ 39 wks for scheduled  rltcs.  +AP course GDMA1, otherwise unremarkable.   Denies VB,ROM or UCs today.  +FM

## 2019-10-03 NOTE — OB PROVIDER H&P - NSHPPHYSICALEXAM_GEN_ALL_CORE
HR: 97 (10-03-19 @ 09:29) (97 - 117)  BP: 122/65 (10-03-19 @ 09:29) (122/65 - 143/87)      A&Ox3  Heart: RRR, S1&S2, no S3  Lungs: Clear bilateral to auscultation, good inspiratory /expiratory effort              no rhonchi, no rales  Abd: soft, Gravid, TOCO in place           +pfannenstial scar- keloid  EFM:  140 bpm/moderate variabilty/+accelerations/no decelerations/CAT 1  TOCO:  no UC  Ext:  FROM / no edema  Neuro: grossly intact

## 2019-10-03 NOTE — OB NEONATOLOGY/PEDIATRICIAN DELIVERY SUMMARY - BABY A: APGAR 5 MIN COLOR, DELIVERY
Addended by: BRENDEN YANCEY on: 2/19/2019 01:55 PM     Modules accepted: Orders     (1) body pink, extremities blue

## 2019-10-04 LAB
BASOPHILS # BLD AUTO: 0.03 K/UL — SIGNIFICANT CHANGE UP (ref 0–0.2)
BASOPHILS NFR BLD AUTO: 0.3 % — SIGNIFICANT CHANGE UP (ref 0–2)
EOSINOPHIL # BLD AUTO: 0.03 K/UL — SIGNIFICANT CHANGE UP (ref 0–0.5)
EOSINOPHIL NFR BLD AUTO: 0.3 % — SIGNIFICANT CHANGE UP (ref 0–6)
HCT VFR BLD CALC: 32.8 % — LOW (ref 34.5–45)
HGB BLD-MCNC: 10.7 G/DL — LOW (ref 11.5–15.5)
IMM GRANULOCYTES NFR BLD AUTO: 0.8 % — SIGNIFICANT CHANGE UP (ref 0–1.5)
LYMPHOCYTES # BLD AUTO: 0.91 K/UL — LOW (ref 1–3.3)
LYMPHOCYTES # BLD AUTO: 7.8 % — LOW (ref 13–44)
MCHC RBC-ENTMCNC: 29.9 PG — SIGNIFICANT CHANGE UP (ref 27–34)
MCHC RBC-ENTMCNC: 32.6 % — SIGNIFICANT CHANGE UP (ref 32–36)
MCV RBC AUTO: 91.6 FL — SIGNIFICANT CHANGE UP (ref 80–100)
MONOCYTES # BLD AUTO: 0.74 K/UL — SIGNIFICANT CHANGE UP (ref 0–0.9)
MONOCYTES NFR BLD AUTO: 6.3 % — SIGNIFICANT CHANGE UP (ref 2–14)
NEUTROPHILS # BLD AUTO: 9.88 K/UL — HIGH (ref 1.8–7.4)
NEUTROPHILS NFR BLD AUTO: 84.5 % — HIGH (ref 43–77)
NRBC # FLD: 0 K/UL — SIGNIFICANT CHANGE UP (ref 0–0)
PLATELET # BLD AUTO: 130 K/UL — LOW (ref 150–400)
PMV BLD: 11.1 FL — SIGNIFICANT CHANGE UP (ref 7–13)
RBC # BLD: 3.58 M/UL — LOW (ref 3.8–5.2)
RBC # FLD: 13.2 % — SIGNIFICANT CHANGE UP (ref 10.3–14.5)
WBC # BLD: 11.68 K/UL — HIGH (ref 3.8–10.5)
WBC # FLD AUTO: 11.68 K/UL — HIGH (ref 3.8–10.5)

## 2019-10-04 RX ORDER — IBUPROFEN 200 MG
600 TABLET ORAL EVERY 6 HOURS
Refills: 0 | Status: DISCONTINUED | OUTPATIENT
Start: 2019-10-04 | End: 2019-10-06

## 2019-10-04 RX ORDER — FERROUS SULFATE 325(65) MG
325 TABLET ORAL DAILY
Refills: 0 | Status: DISCONTINUED | OUTPATIENT
Start: 2019-10-04 | End: 2019-10-06

## 2019-10-04 RX ADMIN — Medication 30 MILLIGRAM(S): at 06:40

## 2019-10-04 RX ADMIN — Medication 600 MILLIGRAM(S): at 18:26

## 2019-10-04 RX ADMIN — Medication 600 MILLIGRAM(S): at 14:00

## 2019-10-04 RX ADMIN — Medication 25 MILLIGRAM(S): at 09:06

## 2019-10-04 RX ADMIN — SIMETHICONE 80 MILLIGRAM(S): 80 TABLET, CHEWABLE ORAL at 13:00

## 2019-10-04 RX ADMIN — Medication 100 MILLIGRAM(S): at 18:27

## 2019-10-04 RX ADMIN — Medication 30 MILLIGRAM(S): at 00:41

## 2019-10-04 RX ADMIN — Medication 600 MILLIGRAM(S): at 12:59

## 2019-10-04 RX ADMIN — Medication 1 TABLET(S): at 18:27

## 2019-10-04 RX ADMIN — Medication 25 MILLIGRAM(S): at 01:09

## 2019-10-04 RX ADMIN — Medication 30 MILLIGRAM(S): at 06:25

## 2019-10-04 RX ADMIN — HEPARIN SODIUM 5000 UNIT(S): 5000 INJECTION INTRAVENOUS; SUBCUTANEOUS at 06:19

## 2019-10-04 RX ADMIN — HEPARIN SODIUM 5000 UNIT(S): 5000 INJECTION INTRAVENOUS; SUBCUTANEOUS at 18:27

## 2019-10-04 RX ADMIN — Medication 30 MILLIGRAM(S): at 00:26

## 2019-10-04 RX ADMIN — Medication 325 MILLIGRAM(S): at 18:27

## 2019-10-04 NOTE — LACTATION INITIAL EVALUATION - LACTATION INTERVENTIONS
Instructed and assisted with positioning  to facilitate deep latch.  Infant sleepy at this time and latch not observed.  Encouraged to feed on cue and follow the feeding log reviewed.  Discussed reasons to avoid formula supplementation of the  , as well as ways to increase breast milk supply and effectiveness of the feeding . Taught hand expression.  Discussed outpatient resources available, warm line, breastfeeding support group. Encouraged to call for assistance, as needed.  Primary RN made aware of consult./initiate skin to skin/initiate hand expression routine/initiate dual electric pump routine

## 2019-10-04 NOTE — PROGRESS NOTE ADULT - SUBJECTIVE AND OBJECTIVE BOX
OB Progress Note:  Delivery, POD#1    S: 35yo POD#1 s/p LTCS . Her pain is well controlled. She is tolerating a regular diet has not yet passed flatus. Denies N/V. Denies CP/SOB/lightheadedness/dizziness. She is ambulating without difficulty. Voiding spontaneously. Denies heavy vaginal bleeding.    O:   Vital Signs Last 24 Hrs  T(C): 37 (04 Oct 2019 01:36), Max: 37.2 (03 Oct 2019 09:37)  T(F): 98.6 (04 Oct 2019 01:36), Max: 99 (03 Oct 2019 09:37)  HR: 95 (03 Oct 2019 18:20) (71 - 117)  BP: 124/62 (03 Oct 2019 18:20) (100/50 - 143/87)  BP(mean): 65 (03 Oct 2019 16:00) (54 - 70)  RR: 18 (04 Oct 2019 01:36) (12 - 18)  SpO2: 100% (04 Oct 2019 01:36) (97% - 100%)    Labs:  Blood type: O Positive  Rubella IgG: RPR: Negative                    PE:  General: NAD  Abdomen: Mildly distended, appropriately tender, Fundus firm, incision c/d/i.  VE: No heavy vaginal bleeding  Extremities: No erythema, no pitting edema

## 2019-10-05 ENCOUNTER — TRANSCRIPTION ENCOUNTER (OUTPATIENT)
Age: 34
End: 2019-10-05

## 2019-10-05 LAB
BASOPHILS # BLD AUTO: 0.04 K/UL — SIGNIFICANT CHANGE UP (ref 0–0.2)
BASOPHILS NFR BLD AUTO: 0.3 % — SIGNIFICANT CHANGE UP (ref 0–2)
EOSINOPHIL # BLD AUTO: 0.09 K/UL — SIGNIFICANT CHANGE UP (ref 0–0.5)
EOSINOPHIL NFR BLD AUTO: 0.8 % — SIGNIFICANT CHANGE UP (ref 0–6)
HCT VFR BLD CALC: 32.5 % — LOW (ref 34.5–45)
HGB BLD-MCNC: 10.5 G/DL — LOW (ref 11.5–15.5)
IMM GRANULOCYTES NFR BLD AUTO: 0.9 % — SIGNIFICANT CHANGE UP (ref 0–1.5)
LYMPHOCYTES # BLD AUTO: 1.14 K/UL — SIGNIFICANT CHANGE UP (ref 1–3.3)
LYMPHOCYTES # BLD AUTO: 9.6 % — LOW (ref 13–44)
MCHC RBC-ENTMCNC: 29.7 PG — SIGNIFICANT CHANGE UP (ref 27–34)
MCHC RBC-ENTMCNC: 32.3 % — SIGNIFICANT CHANGE UP (ref 32–36)
MCV RBC AUTO: 92.1 FL — SIGNIFICANT CHANGE UP (ref 80–100)
MONOCYTES # BLD AUTO: 0.78 K/UL — SIGNIFICANT CHANGE UP (ref 0–0.9)
MONOCYTES NFR BLD AUTO: 6.6 % — SIGNIFICANT CHANGE UP (ref 2–14)
NEUTROPHILS # BLD AUTO: 9.72 K/UL — HIGH (ref 1.8–7.4)
NEUTROPHILS NFR BLD AUTO: 81.8 % — HIGH (ref 43–77)
NRBC # FLD: 0 K/UL — SIGNIFICANT CHANGE UP (ref 0–0)
PLATELET # BLD AUTO: 146 K/UL — LOW (ref 150–400)
PMV BLD: 10.9 FL — SIGNIFICANT CHANGE UP (ref 7–13)
RBC # BLD: 3.53 M/UL — LOW (ref 3.8–5.2)
RBC # FLD: 13.7 % — SIGNIFICANT CHANGE UP (ref 10.3–14.5)
WBC # BLD: 11.88 K/UL — HIGH (ref 3.8–10.5)
WBC # FLD AUTO: 11.88 K/UL — HIGH (ref 3.8–10.5)

## 2019-10-05 RX ORDER — DOCUSATE SODIUM 100 MG
1 CAPSULE ORAL
Qty: 0 | Refills: 0 | DISCHARGE
Start: 2019-10-05

## 2019-10-05 RX ORDER — TETANUS TOXOID, REDUCED DIPHTHERIA TOXOID AND ACELLULAR PERTUSSIS VACCINE, ADSORBED 5; 2.5; 8; 8; 2.5 [IU]/.5ML; [IU]/.5ML; UG/.5ML; UG/.5ML; UG/.5ML
0.5 SUSPENSION INTRAMUSCULAR ONCE
Refills: 0 | Status: COMPLETED | OUTPATIENT
Start: 2019-10-05 | End: 2019-10-05

## 2019-10-05 RX ORDER — OXYCODONE HYDROCHLORIDE 5 MG/1
5 TABLET ORAL
Refills: 0 | Status: DISCONTINUED | OUTPATIENT
Start: 2019-10-05 | End: 2019-10-06

## 2019-10-05 RX ADMIN — Medication 1 TABLET(S): at 12:18

## 2019-10-05 RX ADMIN — HEPARIN SODIUM 5000 UNIT(S): 5000 INJECTION INTRAVENOUS; SUBCUTANEOUS at 06:08

## 2019-10-05 RX ADMIN — Medication 975 MILLIGRAM(S): at 13:00

## 2019-10-05 RX ADMIN — Medication 600 MILLIGRAM(S): at 12:19

## 2019-10-05 RX ADMIN — Medication 975 MILLIGRAM(S): at 18:17

## 2019-10-05 RX ADMIN — Medication 975 MILLIGRAM(S): at 18:47

## 2019-10-05 RX ADMIN — Medication 100 MILLIGRAM(S): at 23:51

## 2019-10-05 RX ADMIN — TETANUS TOXOID, REDUCED DIPHTHERIA TOXOID AND ACELLULAR PERTUSSIS VACCINE, ADSORBED 0.5 MILLILITER(S): 5; 2.5; 8; 8; 2.5 SUSPENSION INTRAMUSCULAR at 23:50

## 2019-10-05 RX ADMIN — Medication 600 MILLIGRAM(S): at 01:55

## 2019-10-05 RX ADMIN — Medication 975 MILLIGRAM(S): at 23:51

## 2019-10-05 RX ADMIN — HEPARIN SODIUM 5000 UNIT(S): 5000 INJECTION INTRAVENOUS; SUBCUTANEOUS at 18:17

## 2019-10-05 RX ADMIN — Medication 600 MILLIGRAM(S): at 23:51

## 2019-10-05 RX ADMIN — Medication 600 MILLIGRAM(S): at 18:16

## 2019-10-05 RX ADMIN — Medication 325 MILLIGRAM(S): at 12:19

## 2019-10-05 RX ADMIN — SIMETHICONE 80 MILLIGRAM(S): 80 TABLET, CHEWABLE ORAL at 12:18

## 2019-10-05 RX ADMIN — Medication 600 MILLIGRAM(S): at 18:47

## 2019-10-05 RX ADMIN — SIMETHICONE 80 MILLIGRAM(S): 80 TABLET, CHEWABLE ORAL at 23:51

## 2019-10-05 RX ADMIN — Medication 975 MILLIGRAM(S): at 12:19

## 2019-10-05 RX ADMIN — Medication 600 MILLIGRAM(S): at 13:00

## 2019-10-05 NOTE — DISCHARGE NOTE OB - CARE PLAN
Principal Discharge DX:	 delivery delivered  Goal:	post partum/ post Op recovery  Assessment and plan of treatment:	s/p uncomplicated repeat   discharged home POD#3 in stable condition

## 2019-10-05 NOTE — DISCHARGE NOTE OB - MEDICATION SUMMARY - MEDICATIONS TO TAKE
I will START or STAY ON the medications listed below when I get home from the hospital:    ibuprofen 600 mg oral tablet  -- 1 tab(s) by mouth every 6 hours  -- Indication: For  delivery delivered    acetaminophen 325 mg oral tablet  -- 3 tab(s) by mouth every 6 hours  -- Indication: For  delivery delivered    Prenatal Multivitamins with Folic Acid 1 mg oral tablet  -- 1 tab(s) by mouth once a day  -- Indication: For  delivery delivered    Colace 100 mg oral capsule  -- 1 cap(s) by mouth 2 times a day, As needed, Stool softening  -- Indication: For  delivery delivered

## 2019-10-05 NOTE — PROGRESS NOTE ADULT - ASSESSMENT
A/P: POD#1 s/p RC/S, GDM    Doing well postop  Ambulate  Increase ISM use  Encourage breast feeding  Cont. PO care

## 2019-10-05 NOTE — DISCHARGE NOTE OB - CARE PROVIDER_API CALL
Shaylee Zhang)  Obstetrics and Gynecology  [address] [place] [address]  Phone: [phone]  Fax: [phone]  Follow Up Time:

## 2019-10-05 NOTE — DISCHARGE NOTE OB - HOSPITAL COURSE
pt underwent repeat , uncomplicated  uncomplicated post op course  discharge home in stable condition on post op day 3

## 2019-10-05 NOTE — DISCHARGE NOTE OB - MATERIALS PROVIDED
Vaccinations/  Immunization Record/[facility]  Screening Program/Breastfeeding Log/Bottle Feeding Log/Shaken Baby Prevention Handout/Breastfeeding Guide and Packet/Birth Certificate Instructions/Breastfeeding Mother’s Support Group Information/Guide to Postpartum Care/[facility] Hearing Screen Program/Back To Sleep Handout/Tdap Vaccination (VIS Pub Date: 2012)/Discharge Medication Information for Patients and Families Pocket Guide

## 2019-10-05 NOTE — PROGRESS NOTE ADULT - SUBJECTIVE AND OBJECTIVE BOX
OB Attending on call: Late entry, Pt. seen on 10/04/19 at 1145 am    Postpartum Note, Repeat Low Transverse  Section  She is a  34y woman who is now post-operative day: 1    Subjective:  The patient feels well.  Denies any C/P, SOB, Calf pain, fever/chills. Passing flatus. She is ambulating.  She is tolerating regular diet. She denies nausea and vomiting.  She is voiding.  Her pain is controlled.  She reports normal postpartum bleeding. She is breastfeeding.  She is formula feeding.    Physical exam:    Vital Signs Last 24 Hrs  T(C): 36.7 (05 Oct 2019 05:47), Max: 36.7 (04 Oct 2019 21:23)  T(F): 98.1 (05 Oct 2019 05:47), Max: 98.1 (05 Oct 2019 05:47)  HR: 70 (05 Oct 2019 05:47) (70 - 82)  BP: 113/61 (05 Oct 2019 05:47) (105/54 - 113/61)  BP(mean): --  RR: 18 (05 Oct 2019 05:47) (18 - 18)  SpO2: 100% (05 Oct 2019 05:47) (100% - 100%)    Gen: NAD  Breast: Soft, nontender, not engorged.  Abdomen: Soft, nontender, no distension , firm uterine fundus at umbilicus.  Incision: Clean, dry, and intact with steri strips  Pelvic: Normal lochia noted  Ext: No calf tenderness    LABS:                        10.5   11.88 )-----------( 146      ( 05 Oct 2019 06:33 )             32.5       Rubella status:     Allergies    No Known Allergies    Intolerances      MEDICATIONS  (STANDING):  acetaminophen   Tablet .. 975 milliGRAM(s) Oral <User Schedule>  diphtheria/tetanus/pertussis (acellular) Vaccine (ADAcel) 0.5 milliLiter(s) IntraMuscular once  ferrous    sulfate 325 milliGRAM(s) Oral daily  heparin  Injectable 5000 Unit(s) SubCutaneous every 12 hours  ibuprofen  Tablet. 600 milliGRAM(s) Oral every 6 hours  influenza   Vaccine 0.5 milliLiter(s) IntraMuscular once  prenatal multivitamin 1 Tablet(s) Oral daily    MEDICATIONS  (PRN):  diphenhydrAMINE 25 milliGRAM(s) Oral every 6 hours PRN Itching  docusate sodium 100 milliGRAM(s) Oral two times a day PRN Stool softening  glycerin Suppository - Adult 1 Suppository(s) Rectal at bedtime PRN Constipation  lanolin Ointment 1 Application(s) Topical every 6 hours PRN Sore Nipples  magnesium hydroxide Suspension 30 milliLiter(s) Oral two times a day PRN Constipation  oxyCODONE    IR 5 milliGRAM(s) Oral every 3 hours PRN Moderate to Severe Pain (4-10)  oxyCODONE    IR 5 milliGRAM(s) Oral once PRN Moderate to Severe Pain (4-10)  simethicone 80 milliGRAM(s) Chew every 4 hours PRN Gas

## 2019-10-05 NOTE — PROGRESS NOTE ADULT - SUBJECTIVE AND OBJECTIVE BOX
33 y/o s/p repeat  POD#2  ambulating, voiding  tolerating reg diet  incisional pain controlled    vitals  /61 P70 RR18 T98.1  cardio ns1s2  lungs ctab  appropriate distention   abdomen firm / non tender uterus at umbilical level  pfann incision clean/dry/intact subcut closure  Lext +1 edema/ non tender bilat    CBC POD#2  wbc 11, h/h 10/32, ptl 146    A/P  33 y/o s/p repeat  POD#2 stable  cont post partum/post op note  cont PO pain mgnt  cont DVT prophylaxis  encouraged to ambulate  plan for discharge home POD#3

## 2019-10-05 NOTE — DISCHARGE NOTE OB - PATIENT PORTAL LINK FT
You can access the FollowMyHealth Patient Portal offered by [facility] by registering at the following website: [email or web address]. By joining [facility] FollowMyHealth portal, you will also be able to view your health information using other applications (apps) compatible with our system.

## 2019-10-06 VITALS
DIASTOLIC BLOOD PRESSURE: 62 MMHG | TEMPERATURE: 98 F | SYSTOLIC BLOOD PRESSURE: 111 MMHG | RESPIRATION RATE: 18 BRPM | HEART RATE: 77 BPM | OXYGEN SATURATION: 100 %

## 2019-10-06 RX ADMIN — Medication 600 MILLIGRAM(S): at 00:17

## 2019-10-06 RX ADMIN — Medication 600 MILLIGRAM(S): at 05:40

## 2019-10-06 RX ADMIN — Medication 100 MILLIGRAM(S): at 05:40

## 2019-10-06 RX ADMIN — HEPARIN SODIUM 5000 UNIT(S): 5000 INJECTION INTRAVENOUS; SUBCUTANEOUS at 05:40

## 2019-10-06 RX ADMIN — Medication 975 MILLIGRAM(S): at 06:15

## 2019-10-06 RX ADMIN — Medication 975 MILLIGRAM(S): at 05:40

## 2019-10-06 RX ADMIN — SIMETHICONE 80 MILLIGRAM(S): 80 TABLET, CHEWABLE ORAL at 05:41

## 2019-10-06 RX ADMIN — Medication 975 MILLIGRAM(S): at 00:17

## 2019-10-06 RX ADMIN — Medication 600 MILLIGRAM(S): at 06:15

## 2019-10-06 NOTE — PROGRESS NOTE ADULT - SUBJECTIVE AND OBJECTIVE BOX
Postop Day  __3_ s/p   C- Section    THERAPY:  [ x ] Spinal morphine   [  ] Epidural morphine   [  ] IV PCA Hydromorphone 1 mg/ml    T(C): 36.7 (10-06-19 @ 05:50), Max: 36.7 (10-06-19 @ 05:50)  HR: 77 (10-06-19 @ 05:50) (77 - 77)  BP: 111/62 (10-06-19 @ 05:50) (111/62 - 111/62)  RR: 18 (10-06-19 @ 05:50) (18 - 18)  SpO2: 100% (10-06-19 @ 05:50) (100% - 100%)    MEDICATIONS  (STANDING):  acetaminophen   Tablet .. 975 milliGRAM(s) Oral <User Schedule>  ferrous    sulfate 325 milliGRAM(s) Oral daily  heparin  Injectable 5000 Unit(s) SubCutaneous every 12 hours  ibuprofen  Tablet. 600 milliGRAM(s) Oral every 6 hours  influenza   Vaccine 0.5 milliLiter(s) IntraMuscular once  prenatal multivitamin 1 Tablet(s) Oral daily    MEDICATIONS  (PRN):  diphenhydrAMINE 25 milliGRAM(s) Oral every 6 hours PRN Itching  docusate sodium 100 milliGRAM(s) Oral two times a day PRN Stool softening  glycerin Suppository - Adult 1 Suppository(s) Rectal at bedtime PRN Constipation  lanolin Ointment 1 Application(s) Topical every 6 hours PRN Sore Nipples  magnesium hydroxide Suspension 30 milliLiter(s) Oral two times a day PRN Constipation  oxyCODONE    IR 5 milliGRAM(s) Oral once PRN Moderate to Severe Pain (4-10)  oxyCODONE    IR 5 milliGRAM(s) Oral every 3 hours PRN Moderate to Severe Pain (4-10)  simethicone 80 milliGRAM(s) Chew every 4 hours PRN Gas      Pain:   _____0______ at rest;  ______1_____with activity    Sedation Score:	  [ x ] Alert	    [  ] Drowsy        [  ] Arousable	[  ] Asleep	[  ] Unresponsive    Side Effects:	  [ x ] None	     [  ] Nausea        [  ] Pruritus        [  ] Weakness   [  ] Numbness        ASSESSMENT/ PLAN  Patient states lower extremities feel and move normally. No apparent anesthetic complications. Continue PRN pain medications.

## 2019-10-06 NOTE — PROGRESS NOTE ADULT - SUBJECTIVE AND OBJECTIVE BOX
OB Postpartum Note:  Delivery, POD#3    S: 35yo POD#3 s/p LTCS. The patient feels well.  Pain is well controlled. She is tolerating a regular diet and passing flatus. She is voiding spontaneously, and ambulating without difficulty. Denies CP/SOB. Denies lightheadedness/dizziness. Denies N/V.    O:  Vitals:  Vital Signs Last 24 Hrs  T(C): 36.8 (05 Oct 2019 21:26), Max: 36.8 (05 Oct 2019 21:26)  T(F): 98.2 (05 Oct 2019 21:26), Max: 98.2 (05 Oct 2019 21:26)  HR: 92 (05 Oct 2019 21:26) (70 - 92)  BP: 102/63 (05 Oct 2019 21:26) (102/63 - 113/61)  BP(mean): --  RR: 18 (05 Oct 2019 21:26) (18 - 18)  SpO2: 100% (05 Oct 2019 21:26) (100% - 100%)    MEDICATIONS  (STANDING):  acetaminophen   Tablet .. 975 milliGRAM(s) Oral <User Schedule>  ferrous    sulfate 325 milliGRAM(s) Oral daily  heparin  Injectable 5000 Unit(s) SubCutaneous every 12 hours  ibuprofen  Tablet. 600 milliGRAM(s) Oral every 6 hours  influenza   Vaccine 0.5 milliLiter(s) IntraMuscular once  prenatal multivitamin 1 Tablet(s) Oral daily    MEDICATIONS  (PRN):  diphenhydrAMINE 25 milliGRAM(s) Oral every 6 hours PRN Itching  docusate sodium 100 milliGRAM(s) Oral two times a day PRN Stool softening  glycerin Suppository - Adult 1 Suppository(s) Rectal at bedtime PRN Constipation  lanolin Ointment 1 Application(s) Topical every 6 hours PRN Sore Nipples  magnesium hydroxide Suspension 30 milliLiter(s) Oral two times a day PRN Constipation  oxyCODONE    IR 5 milliGRAM(s) Oral once PRN Moderate to Severe Pain (4-10)  oxyCODONE    IR 5 milliGRAM(s) Oral every 3 hours PRN Moderate to Severe Pain (4-10)  simethicone 80 milliGRAM(s) Chew every 4 hours PRN Gas      LABS:  Blood type: O Positive  Rubella IgG: RPR: Negative                          10.5<L>   11.88<H> >-----------< 146<L>    ( 10-05 @ 06:33 )             32.5<L>                        10.7<L>   11.68<H> >-----------< 130<L>    ( 10-04 @ 06:25 )             32.8<L>                  Physical exam:  Gen: NAD  Abdomen: Soft, nontender, no distension , firm uterine fundus at umbilicus.  Incision: Clean, dry, and intact   Pelvic: Normal lochia noted  Ext: No calf tenderness        Rafi Martin PGY1

## 2019-10-16 ENCOUNTER — EMERGENCY (EMERGENCY)
Facility: HOSPITAL | Age: 34
LOS: 1 days | Discharge: HOME | End: 2019-10-16
Attending: EMERGENCY MEDICINE | Admitting: EMERGENCY MEDICINE
Payer: MEDICAID

## 2019-10-16 VITALS
HEART RATE: 56 BPM | TEMPERATURE: 98 F | RESPIRATION RATE: 17 BRPM | OXYGEN SATURATION: 100 % | SYSTOLIC BLOOD PRESSURE: 139 MMHG | DIASTOLIC BLOOD PRESSURE: 62 MMHG

## 2019-10-16 VITALS
OXYGEN SATURATION: 100 % | DIASTOLIC BLOOD PRESSURE: 69 MMHG | SYSTOLIC BLOOD PRESSURE: 159 MMHG | HEART RATE: 58 BPM | RESPIRATION RATE: 15 BRPM | TEMPERATURE: 99 F

## 2019-10-16 DIAGNOSIS — Z98.891 HISTORY OF UTERINE SCAR FROM PREVIOUS SURGERY: Chronic | ICD-10-CM

## 2019-10-16 DIAGNOSIS — R10.9 UNSPECIFIED ABDOMINAL PAIN: ICD-10-CM

## 2019-10-16 PROBLEM — Z78.9 OTHER SPECIFIED HEALTH STATUS: Chronic | Status: ACTIVE | Noted: 2019-09-19

## 2019-10-16 LAB
ALBUMIN SERPL ELPH-MCNC: 4.1 G/DL — SIGNIFICANT CHANGE UP (ref 3.3–5)
ALP SERPL-CCNC: 82 U/L — SIGNIFICANT CHANGE UP (ref 40–120)
ALT FLD-CCNC: 15 U/L — SIGNIFICANT CHANGE UP (ref 4–33)
ANION GAP SERPL CALC-SCNC: 13 MMO/L — SIGNIFICANT CHANGE UP (ref 7–14)
APPEARANCE UR: CLEAR — SIGNIFICANT CHANGE UP
AST SERPL-CCNC: 12 U/L — SIGNIFICANT CHANGE UP (ref 4–32)
BACTERIA # UR AUTO: NEGATIVE — SIGNIFICANT CHANGE UP
BASE EXCESS BLDV CALC-SCNC: 3 MMOL/L — SIGNIFICANT CHANGE UP
BASOPHILS # BLD AUTO: 0.06 K/UL — SIGNIFICANT CHANGE UP (ref 0–0.2)
BASOPHILS NFR BLD AUTO: 0.7 % — SIGNIFICANT CHANGE UP (ref 0–2)
BILIRUB SERPL-MCNC: < 0.2 MG/DL — LOW (ref 0.2–1.2)
BILIRUB UR-MCNC: NEGATIVE — SIGNIFICANT CHANGE UP
BLOOD GAS VENOUS - CREATININE: 0.82 MG/DL — SIGNIFICANT CHANGE UP (ref 0.5–1.3)
BLOOD GAS VENOUS - FIO2: 21 — SIGNIFICANT CHANGE UP
BLOOD UR QL VISUAL: SIGNIFICANT CHANGE UP
BUN SERPL-MCNC: 13 MG/DL — SIGNIFICANT CHANGE UP (ref 7–23)
CALCIUM SERPL-MCNC: 9.1 MG/DL — SIGNIFICANT CHANGE UP (ref 8.4–10.5)
CHLORIDE BLDV-SCNC: 109 MMOL/L — HIGH (ref 96–108)
CHLORIDE SERPL-SCNC: 104 MMOL/L — SIGNIFICANT CHANGE UP (ref 98–107)
CO2 SERPL-SCNC: 26 MMOL/L — SIGNIFICANT CHANGE UP (ref 22–31)
COLOR SPEC: SIGNIFICANT CHANGE UP
CREAT SERPL-MCNC: 0.88 MG/DL — SIGNIFICANT CHANGE UP (ref 0.5–1.3)
EOSINOPHIL # BLD AUTO: 0.05 K/UL — SIGNIFICANT CHANGE UP (ref 0–0.5)
EOSINOPHIL NFR BLD AUTO: 0.6 % — SIGNIFICANT CHANGE UP (ref 0–6)
GAS PNL BLDV: 139 MMOL/L — SIGNIFICANT CHANGE UP (ref 136–146)
GLUCOSE BLDV-MCNC: 120 MG/DL — HIGH (ref 70–99)
GLUCOSE SERPL-MCNC: 126 MG/DL — HIGH (ref 70–99)
GLUCOSE UR-MCNC: NEGATIVE — SIGNIFICANT CHANGE UP
HCO3 BLDV-SCNC: 25 MMOL/L — SIGNIFICANT CHANGE UP (ref 20–27)
HCT VFR BLD CALC: 44.2 % — SIGNIFICANT CHANGE UP (ref 34.5–45)
HCT VFR BLDV CALC: 43.6 % — SIGNIFICANT CHANGE UP (ref 34.5–45)
HGB BLD-MCNC: 13.8 G/DL — SIGNIFICANT CHANGE UP (ref 11.5–15.5)
HGB BLDV-MCNC: 14.2 G/DL — SIGNIFICANT CHANGE UP (ref 11.5–15.5)
HYALINE CASTS # UR AUTO: NEGATIVE — SIGNIFICANT CHANGE UP
IMM GRANULOCYTES NFR BLD AUTO: 0.4 % — SIGNIFICANT CHANGE UP (ref 0–1.5)
KETONES UR-MCNC: NEGATIVE — SIGNIFICANT CHANGE UP
LACTATE BLDV-MCNC: 1.4 MMOL/L — SIGNIFICANT CHANGE UP (ref 0.5–2)
LEUKOCYTE ESTERASE UR-ACNC: NEGATIVE — SIGNIFICANT CHANGE UP
LIDOCAIN IGE QN: 28.9 U/L — SIGNIFICANT CHANGE UP (ref 7–60)
LYMPHOCYTES # BLD AUTO: 1.22 K/UL — SIGNIFICANT CHANGE UP (ref 1–3.3)
LYMPHOCYTES # BLD AUTO: 15 % — SIGNIFICANT CHANGE UP (ref 13–44)
MCHC RBC-ENTMCNC: 28.7 PG — SIGNIFICANT CHANGE UP (ref 27–34)
MCHC RBC-ENTMCNC: 31.2 % — LOW (ref 32–36)
MCV RBC AUTO: 91.9 FL — SIGNIFICANT CHANGE UP (ref 80–100)
MONOCYTES # BLD AUTO: 0.2 K/UL — SIGNIFICANT CHANGE UP (ref 0–0.9)
MONOCYTES NFR BLD AUTO: 2.5 % — SIGNIFICANT CHANGE UP (ref 2–14)
NEUTROPHILS # BLD AUTO: 6.6 K/UL — SIGNIFICANT CHANGE UP (ref 1.8–7.4)
NEUTROPHILS NFR BLD AUTO: 80.8 % — HIGH (ref 43–77)
NITRITE UR-MCNC: NEGATIVE — SIGNIFICANT CHANGE UP
NRBC # FLD: 0 K/UL — SIGNIFICANT CHANGE UP (ref 0–0)
PCO2 BLDV: 51 MMHG — SIGNIFICANT CHANGE UP (ref 41–51)
PH BLDV: 7.36 PH — SIGNIFICANT CHANGE UP (ref 7.32–7.43)
PH UR: 6.5 — SIGNIFICANT CHANGE UP (ref 5–8)
PLATELET # BLD AUTO: 319 K/UL — SIGNIFICANT CHANGE UP (ref 150–400)
PMV BLD: 11.1 FL — SIGNIFICANT CHANGE UP (ref 7–13)
PO2 BLDV: 33 MMHG — LOW (ref 35–40)
POTASSIUM BLDV-SCNC: 3.7 MMOL/L — SIGNIFICANT CHANGE UP (ref 3.4–4.5)
POTASSIUM SERPL-MCNC: 3.9 MMOL/L — SIGNIFICANT CHANGE UP (ref 3.5–5.3)
POTASSIUM SERPL-SCNC: 3.9 MMOL/L — SIGNIFICANT CHANGE UP (ref 3.5–5.3)
PROT SERPL-MCNC: 7 G/DL — SIGNIFICANT CHANGE UP (ref 6–8.3)
PROT UR-MCNC: NEGATIVE — SIGNIFICANT CHANGE UP
RBC # BLD: 4.81 M/UL — SIGNIFICANT CHANGE UP (ref 3.8–5.2)
RBC # FLD: 12.2 % — SIGNIFICANT CHANGE UP (ref 10.3–14.5)
RBC CASTS # UR COMP ASSIST: SIGNIFICANT CHANGE UP (ref 0–?)
SAO2 % BLDV: 53 % — LOW (ref 60–85)
SODIUM SERPL-SCNC: 143 MMOL/L — SIGNIFICANT CHANGE UP (ref 135–145)
SP GR SPEC: 1.01 — SIGNIFICANT CHANGE UP (ref 1–1.04)
SQUAMOUS # UR AUTO: SIGNIFICANT CHANGE UP
UROBILINOGEN FLD QL: NORMAL — SIGNIFICANT CHANGE UP
WBC # BLD: 8.16 K/UL — SIGNIFICANT CHANGE UP (ref 3.8–10.5)
WBC # FLD AUTO: 8.16 K/UL — SIGNIFICANT CHANGE UP (ref 3.8–10.5)
WBC UR QL: SIGNIFICANT CHANGE UP (ref 0–?)

## 2019-10-16 PROCEDURE — 74177 CT ABD & PELVIS W/CONTRAST: CPT | Mod: 26

## 2019-10-16 PROCEDURE — 99284 EMERGENCY DEPT VISIT MOD MDM: CPT

## 2019-10-16 RX ORDER — ACETAMINOPHEN 500 MG
975 TABLET ORAL ONCE
Refills: 0 | Status: COMPLETED | OUTPATIENT
Start: 2019-10-16 | End: 2019-10-16

## 2019-10-16 RX ORDER — SODIUM CHLORIDE 9 MG/ML
1000 INJECTION INTRAMUSCULAR; INTRAVENOUS; SUBCUTANEOUS ONCE
Refills: 0 | Status: COMPLETED | OUTPATIENT
Start: 2019-10-16 | End: 2019-10-16

## 2019-10-16 RX ORDER — METOCLOPRAMIDE HCL 10 MG
10 TABLET ORAL ONCE
Refills: 0 | Status: COMPLETED | OUTPATIENT
Start: 2019-10-16 | End: 2019-10-16

## 2019-10-16 RX ORDER — FAMOTIDINE 10 MG/ML
20 INJECTION INTRAVENOUS ONCE
Refills: 0 | Status: COMPLETED | OUTPATIENT
Start: 2019-10-16 | End: 2019-10-16

## 2019-10-16 RX ADMIN — Medication 10 MILLIGRAM(S): at 07:42

## 2019-10-16 RX ADMIN — Medication 975 MILLIGRAM(S): at 07:42

## 2019-10-16 RX ADMIN — SODIUM CHLORIDE 1000 MILLILITER(S): 9 INJECTION INTRAMUSCULAR; INTRAVENOUS; SUBCUTANEOUS at 08:26

## 2019-10-16 RX ADMIN — Medication 975 MILLIGRAM(S): at 08:26

## 2019-10-16 RX ADMIN — SODIUM CHLORIDE 2000 MILLILITER(S): 9 INJECTION INTRAMUSCULAR; INTRAVENOUS; SUBCUTANEOUS at 07:41

## 2019-10-16 RX ADMIN — FAMOTIDINE 20 MILLIGRAM(S): 10 INJECTION INTRAVENOUS at 07:42

## 2019-10-16 NOTE — CONSULT NOTE ADULT - PROBLEM SELECTOR RECOMMENDATION 9
- reglan, pepcid for nausea/vomiting  - tylenol/motrin for pain  - f/u with Dr. Zhang in 2-4 days outpatient    Raquel Baez PGY2  d/w Dr. Montenegro

## 2019-10-16 NOTE — ED PROVIDER NOTE - NSFOLLOWUPINSTRUCTIONS_ED_ALL_ED_FT
Please follow up with your OB/GYN doctor in 2 days.  If you have any new, worsened or concerning symptoms, please return to the emergency department immediately.

## 2019-10-16 NOTE — ED PROVIDER NOTE - OBJECTIVE STATEMENT
34 year old female with no known PMH s/p  10/3 2 weeks ago presents to the ED complaining of LUQ abdominal pain of sudden onset this morning around 1am upon awakening. Pt describes sharp pain, non-radiating, non-pleuritic, no relieving or aggravating factors. Pt e 34 year old female with no known PMH s/p  10/3 2 weeks ago presents to the ED complaining of LUQ abdominal pain of sudden onset this morning around 1am upon awakening. Pt describes sharp pain, non-radiating, non-pleuritic, no relieving or aggravating factors. Pt reports associated nausea with multiple episodes of non-bloody non-bilious vomiting. Pt denies diarrhea, constipation, melena, hematochezia, fever and chills; denies vaginal bleeding, or urinary symptoms; denies chest pain, dyspnea or any other associated complaints.

## 2019-10-16 NOTE — CONSULT NOTE ADULT - SUBJECTIVE AND OBJECTIVE BOX
ADRIÁN RAINEY  34y  Female [id number]    HPI: 34y  s/p scheduled rLTCS 10/3 presenting with LUQ pain x 1 day. Associated with nausea/vomiting. No diarrhea or constipation. No incisional pain, redness, warmth or drainage. No breast pain or tenderness. No breast masses. No         Name of GYN Physician:     POB:      Pgyn: Denies fibroids, cysts, endometriosis, STI's, Abnormal pap smears   PMH: No pertinent family history in first degree relatives  39w3d  39w3d  No pertinent past medical history  S/P  section  POSTPARTUM LEFT SIDE PAINAND VOMITING  RAD CDS  9    PSH: PAST MEDICAL & SURGICAL HISTORY:  No pertinent past medical history  S/P  section: 6/3/2018    Meds:  All: No Known Allergies    Soc: neg x 3  FH: FAMILY HISTORY:  No pertinent family history in first degree relatives    ROS: neg except as noted in [facility]    Hospital Meds:   MEDICATIONS  (STANDING):    MEDICATIONS  (PRN):        Vital Signs Last 24 Hrs  T(C): 36.8 (16 Oct 2019 12:51), Max: 37.1 (16 Oct 2019 06:30)  T(F): 98.2 (16 Oct 2019 12:51), Max: 98.7 (16 Oct 2019 06:30)  HR: 56 (16 Oct 2019 12:51) (55 - 58)  BP: 139/62 (16 Oct 2019 12:51) (122/67 - 159/69)  BP(mean): --  RR: 17 (16 Oct 2019 12:51) (15 - 17)  SpO2: 100% (16 Oct 2019 12:51) (99% - 100%)    Physical Exam:   General: sitting comfortably in bed, NAD   HEENT: neck supple, full ROM  CV: RR S1S2 no m/r/g  Lungs: CTA b/l, good air flow b/l   Back: No CVA tenderness  Abd: Soft, non-tender, non-distended.  Bowel sounds present.    :  No bleeding on pad.    External labia wnl.    Speculum Exam: Scant blood in vault. No active bleeding from os.  Physiologic discharge.    Bimanual Exam: No cervical motion tenderness. Uterus wnl. Adnexae nonpalpable bilaterally. Cervix closed v Cervix dilated to XXX  Ext: non-tender b/l, no edema     LABS:                                      13.8   8.16  )-----------( 319      ( 16 Oct 2019 07:20 )             44.2     10-16    143  |  104  |  13  ----------------------------<  126<H>  3.9   |  26  |  0.88    Ca    9.1      16 Oct 2019 07:20    TPro  7.0  /  Alb  4.1  /  TBili  < 0.2<L>  /  DBili  x   /  AST  12  /  ALT  15  /  AlkPhos  82  10-16    I&O's Detail    16 Oct 2019 07:01  -  16 Oct 2019 13:29  --------------------------------------------------------  IN:    Sodium Chloride 0.9% IV Bolus: 1000 mL  Total IN: 1000 mL    OUT:  Total OUT: 0 mL    Total NET: 1000 mL          Urinalysis Basic - ( 16 Oct 2019 07:32 )    Color: LIGHT YELLOW / Appearance: CLEAR / S.013 / pH: 6.5  Gluc: NEGATIVE / Ketone: NEGATIVE  / Bili: NEGATIVE / Urobili: NORMAL   Blood: SMALL / Protein: NEGATIVE / Nitrite: NEGATIVE   Leuk Esterase: NEGATIVE / RBC: 3-5 / WBC 0-2   Sq Epi: OCC / Non Sq Epi: x / Bacteria: NEGATIVE        RADIOLOGY & ADDITIONAL STUDIES:    < from: CT Abdomen and Pelvis w/ IV Cont (10.16.19 @ 09:58) >    EXAM:  CT ABDOMEN AND PELVIS IC        PROCEDURE DATE:  Oct 16 2019         INTERPRETATION:  CLINICAL INFORMATION: Sudden onset of left upper   quadrant abdominal pain with nausea and multiple episodes of vomiting;    status post  2 weeks ago.     COMPARISON: None.    PROCEDURE:   CT of the Abdomen and Pelvis was performed with intravenous contrast.   Intravenous contrast: 90 ml Omnipaque 350. 10 ml discarded.  Oral contrast: None.  Sagittal and coronal reformats were performed.    FINDINGS:    LOWER CHEST: Within normal limits.    LIVER: Within normal limits.  BILE DUCTS: Normal caliber.  GALLBLADDER: Cholelithiasis.   SPLEEN: Within normal limits.  PANCREAS: Within normal limits.  ADRENALS: Within normal limits.  KIDNEYS/URETERS:Within normal limits.    BLADDER: Within normal limits.  REPRODUCTIVE ORGANS: Status post  section. Postpartum changes.  BOWEL: No bowel obstruction. Appendix is normal.  PERITONEUM: Trace fluid along the left paracolic gutter.  VESSELS: Within normal limits.  RETROPERITONEUM/LYMPH NODES: No lymphadenopathy.    ABDOMINAL WALL: Postsurgical changes in the anterior abdominal wall.  BONES: Within normal limits.    IMPRESSION:     No evidence of acute intra-abdominal pathology.     Trace freefluid in the pelvis and along the left paracolic gutter.    Cholelithiasis.              LEANDRO BULL M.D., RADIOLOGY RESIDENT  This document has been electronically signed.  ELAINE MCCLENDON M.D., ATTENDING RADIOLOGIST  This document has been electronically signed. Oct 16 2019 11:04AM                  < end of copied text > ADRIÁN RIANEY  34y  Female [id number]    HPI: 34y  s/p scheduled rLTCS 10/3 presenting with LUQ pain x 1 day. Associated with nausea/vomiting. Pain is sharp, constant, non-radiating. No diarrhea or constipation. No incisional pain, redness, warmth or drainage. No breast pain, ternderness, or masses. Patient denies fever or chills. Patient denies lightheadedness, shortness of breath, chest pain, and palpitations. No headache, blurry vision, or increased swelling.     Name of GYN Physician: Leatha    POB: LTCS x 2    Pgyn: Denies fibroids, cysts, endometriosis, STI's, Abnormal pap smears   PMSH: LTCS x 2  Meds: none  All: No Known Allergies  Soc: neg x 3  FH: No h/o DVT/PE  ROS: neg except as noted in HPI    Vital Signs Last 24 Hrs  T(C): 36.8 (16 Oct 2019 12:51), Max: 37.1 (16 Oct 2019 06:30)  T(F): 98.2 (16 Oct 2019 12:51), Max: 98.7 (16 Oct 2019 06:30)  HR: 56 (16 Oct 2019 12:51) (55 - 58)  BP: 139/62 (16 Oct 2019 12:51) (122/67 - 159/69)  BP(mean): --  RR: 17 (16 Oct 2019 12:51) (15 - 17)  SpO2: 100% (16 Oct 2019 12:51) (99% - 100%)    Physical Exam:   General: sitting comfortably in bed, NAD   CV: RR S1S2 no m/r/g. No TTP over costo-chondral margins  Lungs: CTA b/l, good air flow b/l   Back: No CVA tenderness  Abd: Soft, non-distended.  Bowel sounds present. Mild TTP RUQ, cook's negative. TTP LUQ with voluntary guarding. No rebound.  Incision: Pfannenstiel well  :  No bleeding on pad. External labia wnl.    Ext: non-tender b/l, no edema     LABS:                                      13.8   8.16  )-----------( 319      ( 16 Oct 2019 07:20 )             44.2     10-16    143  |  104  |  13  ----------------------------<  126<H>  3.9   |  26  |  0.88    Ca    9.1      16 Oct 2019 07:20    TPro  7.0  /  Alb  4.1  /  TBili  < 0.2<L>  /  DBili  x   /  AST  12  /  ALT  15  /  AlkPhos  82  10-16    I&O's Detail    16 Oct 2019 07:01  -  16 Oct 2019 13:29  --------------------------------------------------------  IN:    Sodium Chloride 0.9% IV Bolus: 1000 mL  Total IN: 1000 mL    OUT:  Total OUT: 0 mL    Total NET: 1000 mL          Urinalysis Basic - ( 16 Oct 2019 07:32 )    Color: LIGHT YELLOW / Appearance: CLEAR / S.013 / pH: 6.5  Gluc: NEGATIVE / Ketone: NEGATIVE  / Bili: NEGATIVE / Urobili: NORMAL   Blood: SMALL / Protein: NEGATIVE / Nitrite: NEGATIVE   Leuk Esterase: NEGATIVE / RBC: 3-5 / WBC 0-2   Sq Epi: OCC / Non Sq Epi: x / Bacteria: NEGATIVE        RADIOLOGY & ADDITIONAL STUDIES:    < from: CT Abdomen and Pelvis w/ IV Cont (10.16.19 @ 09:58) >    EXAM:  CT ABDOMEN AND PELVIS IC        PROCEDURE DATE:  Oct 16 2019         INTERPRETATION:  CLINICAL INFORMATION: Sudden onset of left upper   quadrant abdominal pain with nausea and multiple episodes of vomiting;    status post  2 weeks ago.     COMPARISON: None.    PROCEDURE:   CT of the Abdomen and Pelvis was performed with intravenous contrast.   Intravenous contrast: 90 ml Omnipaque 350. 10 ml discarded.  Oral contrast: None.  Sagittal and coronal reformats were performed.    FINDINGS:    LOWER CHEST: Within normal limits.    LIVER: Within normal limits.  BILE DUCTS: Normal caliber.  GALLBLADDER: Cholelithiasis.   SPLEEN: Within normal limits.  PANCREAS: Within normal limits.  ADRENALS: Within normal limits.  KIDNEYS/URETERS:Within normal limits.    BLADDER: Within normal limits.  REPRODUCTIVE ORGANS: Status post  section. Postpartum changes.  BOWEL: No bowel obstruction. Appendix is normal.  PERITONEUM: Trace fluid along the left paracolic gutter.  VESSELS: Within normal limits.  RETROPERITONEUM/LYMPH NODES: No lymphadenopathy.    ABDOMINAL WALL: Postsurgical changes in the anterior abdominal wall.  BONES: Within normal limits.    IMPRESSION:     No evidence of acute intra-abdominal pathology.     Trace freefluid in the pelvis and along the left paracolic gutter.    Cholelithiasis.              LEANDRO BULL M.D., RADIOLOGY RESIDENT  This document has been electronically signed.  ELAINE MCCLENDON M.D., ATTENDING RADIOLOGIST  This document has been electronically signed. Oct 16 2019 11:04AM                  < end of copied text > ADRIÁN RAINEY  34y  Female [id number]    HPI: 34y  s/p scheduled rLTCS 10/3 presenting with LUQ pain x 1 day today starting at 0130 am sudden onset with sharp pain at 10/10 without any radiation. Associated with nausea/vomiting. Pain is sharp, constant, non-radiating. No diarrhea or constipation. No incisional pain, redness, warmth or drainage. No breast pain, tenderness, or masses. Patient denies fever or chills. Patient denies lightheadedness, shortness of breath, chest pain, and palpitations. No headache, blurry vision, or increased swelling. Given Pepcid 20 mg PO, Reglan 10 mg and Tylenol PO.     Name of GYN Physician: Leatha    POB: LTCS x 2    Pgyn: Denies fibroids, cysts, endometriosis, STI's, Abnormal pap smears   PMSH: LTCS x 2  Meds: none  All: No Known Allergies  Soc: neg x 3  FH: No h/o DVT/PE  ROS: neg except as noted in HPI    Vital Signs Last 24 Hrs  T(C): 36.8 (16 Oct 2019 12:51), Max: 37.1 (16 Oct 2019 06:30)  T(F): 98.2 (16 Oct 2019 12:51), Max: 98.7 (16 Oct 2019 06:30)  HR: 56 (16 Oct 2019 12:51) (55 - 58)  BP: 139/62 (16 Oct 2019 12:51) (122/67 - 159/69)  BP(mean): --  RR: 17 (16 Oct 2019 12:51) (15 - 17)  SpO2: 100% (16 Oct 2019 12:51) (99% - 100%)    Physical Exam:   General: sitting comfortably in bed, NAD   CV: RR S1S2 no m/r/g. No TTP over costo-chondral margins  Lungs: CTA b/l, good air flow b/l   Back: No CVA tenderness  Abd: Soft, non-distended.  Bowel sounds present. Mild TTP RUQ, cook's negative. TTP LUQ with voluntary guarding. No rebound.  Incision: Pfannenstiel well  :  No bleeding on pad. External labia wnl.    Ext: non-tender b/l, no edema     LABS:                                      13.8   8.16  )-----------( 319      ( 16 Oct 2019 07:20 )             44.2     10-16    143  |  104  |  13  ----------------------------<  126<H>  3.9   |  26  |  0.88    Ca    9.1      16 Oct 2019 07:20    TPro  7.0  /  Alb  4.1  /  TBili  < 0.2<L>  /  DBili  x   /  AST  12  /  ALT  15  /  AlkPhos  82  10-16    I&O's Detail    16 Oct 2019 07:01  -  16 Oct 2019 13:29  --------------------------------------------------------  IN:    Sodium Chloride 0.9% IV Bolus: 1000 mL  Total IN: 1000 mL    OUT:  Total OUT: 0 mL    Total NET: 1000 mL          Urinalysis Basic - ( 16 Oct 2019 07:32 )    Color: LIGHT YELLOW / Appearance: CLEAR / S.013 / pH: 6.5  Gluc: NEGATIVE / Ketone: NEGATIVE  / Bili: NEGATIVE / Urobili: NORMAL   Blood: SMALL / Protein: NEGATIVE / Nitrite: NEGATIVE   Leuk Esterase: NEGATIVE / RBC: 3-5 / WBC 0-2   Sq Epi: OCC / Non Sq Epi: x / Bacteria: NEGATIVE        RADIOLOGY & ADDITIONAL STUDIES:    < from: CT Abdomen and Pelvis w/ IV Cont (10.16.19 @ 09:58) >    EXAM:  CT ABDOMEN AND PELVIS IC        PROCEDURE DATE:  Oct 16 2019         INTERPRETATION:  CLINICAL INFORMATION: Sudden onset of left upper   quadrant abdominal pain with nausea and multiple episodes of vomiting;    status post  2 weeks ago.     COMPARISON: None.    PROCEDURE:   CT of the Abdomen and Pelvis was performed with intravenous contrast.   Intravenous contrast: 90 ml Omnipaque 350. 10 ml discarded.  Oral contrast: None.  Sagittal and coronal reformats were performed.    FINDINGS:    LOWER CHEST: Within normal limits.    LIVER: Within normal limits.  BILE DUCTS: Normal caliber.  GALLBLADDER: Cholelithiasis.   SPLEEN: Within normal limits.  PANCREAS: Within normal limits.  ADRENALS: Within normal limits.  KIDNEYS/URETERS:Within normal limits.    BLADDER: Within normal limits.  REPRODUCTIVE ORGANS: Status post  section. Postpartum changes.  BOWEL: No bowel obstruction. Appendix is normal.  PERITONEUM: Trace fluid along the left paracolic gutter.  VESSELS: Within normal limits.  RETROPERITONEUM/LYMPH NODES: No lymphadenopathy.    ABDOMINAL WALL: Postsurgical changes in the anterior abdominal wall.  BONES: Within normal limits.    IMPRESSION:     No evidence of acute intra-abdominal pathology.     Trace freefluid in the pelvis and along the left paracolic gutter.    Cholelithiasis.              LEANDRO BULL M.D., RADIOLOGY RESIDENT  This document has been electronically signed.  ELAINE MCCLENDON M.D., ATTENDING RADIOLOGIST  This document has been electronically signed. Oct 16 2019 11:04AM                  < end of copied text >

## 2019-10-16 NOTE — ED PROVIDER NOTE - PROGRESS NOTE DETAILS
DIOGO Haynes: Labs reviewed, no gross findings. CT A/P No evidence of acute intra-abdominal pathology. Trace free fluid in the pelvis and along the left paracolic gutter. Cholelithiasis.  Pt seen by OB/GYN Attending at this time who recommends outpatient follow up.  Pt reassessed, feels better, abdomen soft nontender, ate and tolerated oral intake well. Stable for DC home for f/u with OB/GYN in 2 days. Return precautions. DIOGO Haynes: Discharge instructions reviewed with patient who walked prior to signing the discharge papers. ED Attending Dr. Varela was made aware.

## 2019-10-16 NOTE — CONSULT NOTE ADULT - ATTENDING COMMENTS
Pt. was seen and examined in the ED     33 yo  female S/P Repeat C/S on 10/03/19 presented today to ED with sudden onset LUQ pain with N/V x 3 and BM x 3. Pain was 10/10, sharp, and constant without any radiation. CT showed Mild pelvic fluid and postop changes and Cholelithiasis but No Cholecystitis. WBC is normal and patient felt better after Pepcid 20 mg PO, Tylenol and Reglan. She tolerated Tea and No more vomiting after coming to the ED. Pain is now 4 to 5/10 and dull.     Consult note as above per Resident, reviewed, edited and added as necessary and agree with above    A/P: POD#13 s/p Repeat C/S (uncomplicated), LUQ pain, Cholelithiasis, Suspect GI (Gastroenteritis??)    Pepcid 20 mg PO BID x 10 days  Regaln 10 mg PO TID PRN #10 tabs  Patient will take Motrin or Tylenol for the pain  F/U with Dr. Zhang in 2 to 4 days   RT ED if pain gets worse or N/V

## 2019-10-16 NOTE — ED PROVIDER NOTE - ATTENDING CONTRIBUTION TO CARE
Afebrile. Awake and Alert. Lungs CTA. Heart RRR. Abdomen soft, +LUQ TTP, ND,  site c/d/i, no cva TTP. CN II-XII grossly intact. Moves all extremities without lateralization.    r/o gastritis  r/o pancreatitis  r/o SBO: Less likely, moving bowels and  only abdominal Hx  r/o ureterolithaisis Afebrile. Awake and Alert. Lungs CTA. Heart RRR. Abdomen soft, +LUQ TTP, ND,  site c/d/i, no cva TTP. CN II-XII grossly intact. Moves all extremities without lateralization.    r/o gastritis  r/o pancreatitis  r/o SBO: Less likely, moving bowels and  only abdominal Hx  r/o ureterolithiasis

## 2019-10-16 NOTE — ED PROVIDER NOTE - CLINICAL SUMMARY MEDICAL DECISION MAKING FREE TEXT BOX
34 year old female with no known PMH s/p  10/3 2 weeks ago presents to the ED complaining of LUQ abdominal pain of sudden onset this morning around 1am upon awakening. Hemodynamically stable. Abdomen non-acute. Impression: Gastritis, r/o pancreatitis. Plan for labs and UA, supportive care, serial abdominal exams, reassess.

## 2019-10-16 NOTE — ED ADULT TRIAGE NOTE - CHIEF COMPLAINT QUOTE
alert s/p c section 10/3  now c/o sharp pain  LUQ abd  vomited  x 3   no fever  hx gestational Diabetes

## 2019-10-16 NOTE — ED ADULT NURSE REASSESSMENT NOTE - REASSESS COMMUNICATION
Alert and oriented x 3 no acute complaints. IVF infusing per order. pain to abdomen improved. no acute complaints. resting comfortably in stretcher. Alert and oriented x 3 no acute complaints. IVF infusing per order. pain to abdomen improved. no acute complaints. resting comfortably in stretcher. IVF infusing per order. No infiltration noted to heplock to left AC.

## 2019-10-16 NOTE — ED ADULT NURSE NOTE - OBJECTIVE STATEMENT
Pt rcvd to 14 - c/o sharp, constant non-radiating LUQ abdominal pain, nausea/vomiting x4 (nonbloody, nonbilious), acutely beginning around 1:30am.  Pt states felt fine following her evening meal, went to bed in usual state of health.  Denies recent fevers/chills, sick contacts, questionable foods, diarrhea/constipation, urinary complaints, or other.  Pt is postop 10/3 for  (2nd childbirth w/ c-sect), no other prior abdominal surgeries or significant PMHx aside from gestational diabetes (not currently on medications).  Pt appearing uncomfortable, abdomen is soft, nondistended, nontender, pain localized to LUQ area.  Pt  site is healing, well approximated, w/o erythema/warmth/drainage.  Pt w/ mild vaginal spotting but states has improved since birth.  Appearing well, in no acute distress, vitally stable, aaox4, ambulatory, at this time.  ED MD Palma @ bedside for evaluation, will await further orders.

## 2019-10-16 NOTE — ED PROVIDER NOTE - PATIENT PORTAL LINK FT
You can access the FollowMyHealth Patient Portal offered by [facility] by registering at the following website: [email or web address]. By joining [facility]’s FollowMyHealth portal, you will also be able to view your health information using other applications (apps) compatible with our system.

## 2022-10-19 NOTE — DISCHARGE NOTE OB - NS AS DC STROKE DX YN
Ongoing SW/CM Assessment/Plan of Care Note     See SW/CM flowsheets for goals and other objective data.    Patient/Family discharge goal (s):  Goal #1: Psychosocial needs assessed  Goal #2: Communication facilitated       PT Recommendation:          OT Recommendation:          SLP Recommendation:       Disposition:       Progress note:     KAI attended OFT's this date and RN Lina reported pt is having swelling in her right ankle. Xray to be done later today. SW came into pt's room as her son Alexis was at bedside. KAI introduced self to Alexis. The pt acknowledged she remembered this writer from yesterday. KAI provided to Alexis the original along with copies of the HCPOA and explained why psych was asked to see the pt yesterday. See KAI note from 10/18. KAI also provided Alexis with the list of medical alert devices for his wife to look into. KAI discussed CASSIA and both the pt and Alexis agreed she will require CASSIA before returning home.     KAI placed call to Ella, Alexis's wife as she oversees all of pt's medical. KAI introduced self to Ella who was agreeable to the call. KAI discussed the above with Ella. KAI discussed CASSIA at time of discharge. Ella requested SW put in referral to [facility] as pt has been there in the past and also [facility].     KAI made referrals per [facility] protocol. SW to continue to follow.     Addendum: 3:30 p.m    KAI received call from Flakito Martinez who advised no beds are available. KAI heard Ty from [facility] who advised pt is medically accepted to [facility]. KAI will provide Ty with update regarding pt's readiness for discharge tomorrow.          no

## 2023-02-09 NOTE — PACU DISCHARGE NOTE - NAUSEA/VOMITING:
She continues to smoke a few cigarettes a day she was again counseled 3 to 10 minutes on the benefits of and ways to quit smoking None

## 2025-01-22 NOTE — DISCHARGE NOTE OB - NURSING SECTION COMPLETE
PA not needed. Test claim for 50mg shows refill too soon. Test claim for 10mg paid.   Patient/Caregiver provided printed discharge information.